# Patient Record
Sex: FEMALE | Race: WHITE | URBAN - METROPOLITAN AREA
[De-identification: names, ages, dates, MRNs, and addresses within clinical notes are randomized per-mention and may not be internally consistent; named-entity substitution may affect disease eponyms.]

---

## 2019-04-10 ENCOUNTER — OFFICE VISIT (OUTPATIENT)
Dept: URBAN - METROPOLITAN AREA CLINIC 16 | Facility: CLINIC | Age: 59
End: 2019-04-10
Payer: COMMERCIAL

## 2019-04-10 DIAGNOSIS — H25.13 AGE-RELATED NUCLEAR CATARACT, BILATERAL: ICD-10-CM

## 2019-04-10 DIAGNOSIS — E11.9 TYPE 2 DIABETES MELLITUS WITHOUT COMPLICATIONS: Primary | ICD-10-CM

## 2019-04-10 PROCEDURE — 92004 COMPRE OPH EXAM NEW PT 1/>: CPT | Performed by: OPTOMETRIST

## 2019-04-10 ASSESSMENT — KERATOMETRY
OD: 45.38
OS: 45.50

## 2019-04-10 ASSESSMENT — INTRAOCULAR PRESSURE
OS: 17
OD: 17

## 2019-04-10 NOTE — IMPRESSION/PLAN
Impression: Type 2 diabetes mellitus without complications: P55.8. Plan: Diabetes type II: no background retinopathy, no signs of neovascularization or macular edema noted. Discussed ocular and systemic benefits of blood sugar control.

## 2023-09-06 RX ORDER — ESOMEPRAZOLE MAGNESIUM 40 MG/1
40 CAPSULE, DELAYED RELEASE ORAL
COMMUNITY

## 2023-09-06 RX ORDER — LOSARTAN POTASSIUM 25 MG/1
25 TABLET ORAL DAILY
COMMUNITY

## 2023-09-06 RX ORDER — TRAMADOL HYDROCHLORIDE 50 MG/1
50 TABLET ORAL EVERY 6 HOURS PRN
COMMUNITY
End: 2023-10-27

## 2023-09-06 RX ORDER — HYDROCHLOROTHIAZIDE 12.5 MG/1
12.5 TABLET ORAL DAILY
COMMUNITY

## 2023-10-16 NOTE — PROGRESS NOTES
Discharge plan according to [facility]:     09/06/23 1431   Discharge Planning   Patient/Family Anticipates Transition to home with family   Living Arrangements   People in Home child(tristin), adult  (daughter)   Type of Residence Private Residence   Is your private residence a single family home or apartment? Single family home   Number of Stairs, Within Home, Primary ten   Once home, are you able to live on one level? Yes   Which level? Main Level   Bathroom Shower/Tub Walk-in shower   Equipment Currently Used at Home raised toilet seat   Support System   Support Systems Children   Do you have someone available to stay with you one or two nights once you are home? Yes

## 2023-10-26 NOTE — PROVIDER NOTIFICATION
I am evaluating this patient for upcoming Right Total Knee Arthroplasty with Dr. Long at [facility] on 11/2/23:    - Notified by Ortho Education RN today that patient had recent elevated hemoglobin A1C 8.2 on 10/2/23. On dulaglutide (Trulicity) for diabetes. Goal A1C is < 8.0 for elective total joint arthroplasty surgery. Message sent to Dr. Long and his PA-C notifying them of this A1C. Waiting for response.  Ortho Education RN left detailed voicemail for patient instructing her to please hold dulaglutide (Trulicity) for 7 days before surgery. Preop H&P is scheduled for 10/27/23 at [facility]. Will follow up on results of preop H&P as soon as complete. Call me below if any questions.     - Update 10/26/23 at 2:24 pm: Dr. Long responded and is tentatively ok proceeding with surgery with A1C 8.2 since patient recently changed her diabetes medication and reports daily BG checks have been much improved over past 3-4 weeks. However, we will wait to see results of preop H&P visit scheduled tomorrow, 10/27/23 before making final decision about proceeding. Call me below if any questions.     - Update 10/30/23 at 11:30 am: reviewed preop H&P and labs. Cleared by PCP for surgery. No abnormal labs at preop exam on 10/27/23. Sent message confirming Dr. Long is ok proceeding with hemoglobin A1C 8.2 on 10/2/23. Waiting for response.     - Update 10/30/23 at 12:28 pm: Dr. Long is ok proceeding with A1C of 8.2. Full Treatment Plan note to follow.       MAXIMO Mirza, CNP   Advanced Practice Nurse Navigator- Orthopedics  [facility]   Office Phone: [phone]  Direct Fax: [phone]

## 2023-10-27 NOTE — H&P (VIEW-ONLY)
[facility]      Preoperative Consultation   Savanah Chung   : 1960   Gender: female    Date of Encounter: 10/27/2023    Nursing Notes:   Zulay Mckay  10/27/2023  3:32 PM  Sign at exiting of workspace  Savanah Chung is a 63 y.o. female (1960) who presents for preop evaluation undergoing surgery for treatment of right knee arthroplasty.    Date of Surgery: 23  Surgical Specialty: Orthopedic/Spine  Sean Long MD  Hospital/Surgical Facility:  [facility]   Fax number: [phone]  Surgery type: inpatient  Primary Physician: Ree Staton LPN................... 10/27/2023 3:32 PM      Zulay Mckay  10/27/2023  3:36 PM  Sign at exiting of workspace  Chief Complaint   Patient presents with     Pre-Op Exam     23       Additional visit information (chief complaint/health maintenance) shared by patient:     Health Maintenance Due   Topic Date Due     COVID-19 vaccine series (1) Never done     Pneumococcal series for age 6-64 (1 - PCV) Never done     Tdap  Never done     Depression screening for age 12+  Never done     HIV for age 15-65  Never done     Hepatitis C screening for age 18-79  Never done     Tetanus booster  Never done     Mammogram for age 45-75  Never done     Colonoscopy through age 75  Never done     Zoster (shingles) series for age 50+ (1 of 2) Never done     Influenza for age 50-64  Never done       Health maintenance reviewed with patient Yes    Patient presents for an in-person office visit: alone  Communication Method: Patient is active on [employer] and has been instructed that results/communications will be made via [employer]  If a phone call is needed, the preferred number is:  Mobile   Home Phone [phone]   Mobile [phone]     May we leave a detailed message at this number? Yes    Zulay Mckay LPN................... 10/27/2023 3:32 PM           History of Present Illness   Right knee surgery, pt has severe arthritis and trouble  getting around.      Review of Systems   A comprehensive review of systems was negative except for items noted in HPI.    Patient Active Problem List   Diagnosis Code     Type 2 diabetes mellitus without complication, without long-term current use of insulin (HC) E11.9     Chronic GERD K21.9     Primary hypertension I10     Arthritis M19.90     Current Outpatient Medications   Medication Sig     dulaglutide (TRULICITY) 1.5 mg/0.5 mL subcutaneous pen Inject 1.5 mg subcutaneous once weekly.     esomeprazole (NEXIUM) 40 mg capsule Take 1 Capsule (40 mg) by mouth once daily.     hydroCHLOROthiazide 12.5 mg tablet Take 1 Tablet (12.5 mg) by mouth once daily.     losartan (COZAAR) 25 mg tablet Take 1 Tablet (25 mg) by mouth once daily.     traMADoL (ULTRAM) 50 mg tablet Take 50 mg by mouth.     No current facility-administered medications for this visit.     Medications have been reviewed by me and are current to the best of my knowledge and ability.     Allergies   Allergen Reactions     Amoxicillin Dyspnea     Propoxyphene N-Acetaminophen Dyspnea     Penicillins *Unknown - Follow up needed     Trouble Breathing     Past Surgical History:   . Laterality Date     ANKLE SURGERY Left      CHOLECYSTECTOMY       KNEE SURGERY Right      TOTAL ABDOMINAL HYSTERECTOMY       Social History     Tobacco Use     Smoking status: Never     Smokeless tobacco: Never   Vaping Use     Vaping Use: Never used   Substance Use Topics     Alcohol use: Never     Drug use: Never     Family History   Problem Relation Age of Onset     Asthma Mother      Cancer-breast Mother      Hypertension Mother      Kidney disease Mother      Hyperlipidemia Mother      Leukemia Father      Hypertension Father      Diabetes Father      SRINATH disease Brother      Hyperlipidemia Maternal Grandmother      Coronary artery disease Maternal Grandmother      No Known Problems Maternal Grandfather      GI Disease Paternal Grandmother        PAST DIFFICULTY WITH  "ANESTHESIA: Yes, personal hx after ankle surgery was told probable hemorrhage because of BP issues during anesthesia.      Physical Exam   /80 (Cuff Site: Left Arm, Position: Sitting, Cuff Size: Adult Large)   Pulse 88   Ht 1.65 m (5' 4.96\")   Wt 115.2 kg (254 lb)   BMI 42.32 kg/m   Body mass index is 42.32 kg/m .  Physical Exam  Constitutional:       Appearance: Normal appearance.   HENT:      Head: Normocephalic and atraumatic.      Nose: Nose normal.      Mouth/Throat:      Mouth: Mucous membranes are moist.      Pharynx: Oropharynx is clear.   Eyes:      Extraocular Movements: Extraocular movements intact.      Conjunctiva/sclera: Conjunctivae normal.   Cardiovascular:      Rate and Rhythm: Normal rate and regular rhythm.      Heart sounds: Normal heart sounds.   Pulmonary:      Effort: Pulmonary effort is normal.      Breath sounds: Normal breath sounds.   Musculoskeletal:         General: Normal range of motion.      Cervical back: Normal range of motion.   Skin:     General: Skin is warm and dry.   Neurological:      General: No focal deficit present.      Mental Status: She is alert and oriented to person, place, and time.   Psychiatric:         Mood and Affect: Mood normal.         Behavior: Behavior normal.            Assessment / Plan     The Pre-Op Tool    Recommendations      Intermediate Risk Procedure    Risk of CV Complication (RCRI)  0.5%    Current Cardiac Status  Good exertional capacity ( > 4 mets )    Cardiac History  No history of coronary artery disease           Labs  HGB within last 30 days  Potassium within last 30 days  EKG  Baseline EKG within the last 12 months  CXR  Not indicated    Stress Testing  Not indicated    * Testing recommendations are intended to assist, but not direct, clinical decisions.           Type & Screen should be obtained by Anesthesia only if the risk of transfusion is > 5% for the procedure         Hold injectable, non-insulin diabetes medication on the " day of the procedure.  Hold Losartan / HCTZ the evening before and/or morning of the procedure.  Take your usual dose of opioid pain medicine the morning of procedure  Okay to take Acetaminophen (Tylenol) up until the procedure  Hold / avoid NSAIDs (e.g. ibuprofen, naproxen) prior to procedure: 2 days for ibuprofen (Advil) and 4 days for naproxen (Aleve).    * Medication recommendations are not intended to be exhaustive; they are limited to common medications that are potentially dangerous if incorrectly managed          Labs  * Data supports elimination of  routine  laboratory testing in favor of focused,  indicated  testing based on medical co-morbidities. A 2009 study randomized 1061 patients undergoing ambulatory, non-cataract surgery to routine or to indicated testing. Perioperative adverse events were similar (Anesthesia & Analgesia 2009;108:467-75; Anesthesiol. Clin. 2016 Mar;34(1):43-58).  EKG  * Age alone is not an absolute indication for a preoperative EKG, and in the absence of clinical risk factors, there is no consensus on the utility of routine preoperative EKG. Our experts recommend against obtaining an EKG if age < 65 for intermediate risk procedures (Anesthesology. 2012;116(3) 1-17; JACC. 2014;64(21);e1-76).  Stress Testing  * Data from high risk patients undergoing major vascular surgery have failed to demonstrate benefit from either preoperative stress testing or prophylactic revascularization. A large, observational study found low risk of major perioperative adverse events in patients able to achieve =4 mets. Taken together with existing knowledge, for patients with known or suspected CAD, our experts recommend preoperative stress testing only if it is indicated regardless of the planned surgery (N Engl J Med 2004;351:2795-80; J Am Madeline Cardiol 2014;64:9286-2776; IFRAH 2020;324:274-290;).     Session ID: [id number]  Endnotes and bibliography available upon request:  [email or web address]  Labs: pending  ECG: pending    ICD-10-CM    1. Pre-op examination  Z01.818 OK READING EKG - NO CHARGE, COMP ONLY     EKG 12 LEAD     OK ECG ROUTINE ECG W/LEAST 12 LDS W/I&R     HEMOGLOBIN     POTASSIUM      2. Chronic pain of right knee  M25.561     G89.29       3. Type 2 diabetes mellitus without complication, without long-term current use of insulin (HC)  E11.9 URINE ALBUMIN TO CREATININE RATIO, RANDOM     AMB CONSULT TO OPTOMETRY/OPHTHALMOLOGY        Patient is cleared, pending tests ordered today, for planned procedure.   Electronically Signed by:       Ree MARSH, CNP.............. 10/27/2023    3:56 PM

## 2023-10-31 NOTE — TREATMENT PLAN
Orthopedic Surgery Pre-Op Plan: Savanah Chung  pre-op review. This is NOT an H&P   Surgeon: Dr. Long    Hospital: [facility]  Name of Surgery: Right Total Knee Arthroplasty   Date of Surgery: 11/2/23  H&P: Completed on 10/27/23 by Ree Staton NP at [facility].   History of ASA, NSAIDS, vitamin and/or herbal supplements, GLP-1 Agonist medication taken within 10 days?: Yes- on dulaglutide (Trulicity)- patient called and instructed to take last dose on 10/25/23, then hold for surgery.   History of blood thinners?: No    Plan:   1) Discharge Plan: Home morning of POD 1 with assist Daughter. Please see Discharge Planning section near bottom of this note for further details.     2) Hypertension: /80 at preop exam.  Appears reasonably well controlled on losartan and hydrochlorothiazide.  Patient instructed to hold both of these medications on the morning of surgery.    3) Type 2 Diabetes Mellitus: Poor control.  Most recent hemoglobin A1c 8.2 on 10/2/2023.  Cleared by PCP for surgery.  Goal hemoglobin A1c is < 8 for elective total joint surgery.  Dr. Long notified of elevated A1c and is okay proceeding as patient is reporting her daily BG checks are much improved over the past 3-4 weeks since changing diabetes medication to dulaglutide (Trulicity). Dulaglutide is a GLP-1 agonist medication, so patient was instructed to hold it for 7 days before surgery to decrease risk for nausea/vomiting/aspiration with anesthesia. I recommend blood glucose checks at least three times a day and at bedtime during hospital stay. Goal BG < 180 to decrease risk for infection and wound healing complications. Nursing to please notify Hospitalist if BG > 180.      4) Morbid Obesity: BMI 42.3, Wt: 254 lbs at preop exam.  I recommend continued efforts at safe weight loss following recovery from surgery. If patient is interested in further assistance with weight loss, please consider referral to  [facility]. They offer both non-surgical and surgical evidence-based weight loss strategies. Call [phone] to schedule a consultation to learn more.      5) GERD: On omeprazole.    Patient appears medically optimized for upcoming surgery. I would recommend Hospitalist Consult to assist with medical management. Please call me below with any questions on this patient.       Review of Systems Notable for: Hypertension, type 2 diabetes mellitus, morbid obesity, GERD.    Past Medical History:   Past Medical History:   Diagnosis Date    Arthritis     Diabetes (H)     Gastroesophageal reflux disease     Hypertension     Obese      History reviewed. No pertinent surgical history.    Current Medications:  Patient's Medications   New Prescriptions    No medications on file   Previous Medications    DULAGLUTIDE (TRULICITY) 1.5 MG/0.5ML PEN    Inject 1.5 mg Subcutaneous every 7 days Left VM message to stop 10/26/23 before surgery    ESOMEPRAZOLE (NEXIUM) 40 MG DR CAPSULE    Take 40 mg by mouth every morning (before breakfast) Take 30-60 minutes before eating.    HYDROCHLOROTHIAZIDE (HYDRODIURIL) 12.5 MG TABLET    Take 12.5 mg by mouth daily    LOSARTAN (COZAAR) 25 MG TABLET    Take 25 mg by mouth daily   Modified Medications    No medications on file   Discontinued Medications    EMPAGLIFLOZIN (JARDIANCE) 25 MG TABS TABLET    Take 35 mg by mouth daily    TRAMADOL (ULTRAM) 50 MG TABLET    Take 50 mg by mouth every 6 hours as needed for severe pain       ALLERGIES:  Allergies   Allergen Reactions    Penicillins Shortness Of Breath and Swelling    Amoxicillin Difficulty breathing       Social History  Social History     Tobacco Use    Smoking status: Never    Smokeless tobacco: Never   Substance Use Topics    Alcohol use: Not Currently    Drug use: Yes     Comment: tramadol PRN for pain       Any Abnormal Recent Diagnostics? Yes  Hemoglobin A1c 8.2 on 10/2/2023: Shows poor control of type 2  diabetes.  Dr. Long notified and is okay proceeding as patient reports improved daily BG control for the past 3-4 weeks since changing diabetes medication to dulaglutide (Trulicity).  We will monitor BG's closely during hospital stay.  Goal BG <180.    Discharge Planning:   Discharge plan according to [facility]:    Home morning of POD 1 with assist Daughter.     09/06/23 1431   Discharge Planning   Patient/Family Anticipates Transition to home with family   Living Arrangements   People in Home child(tristin), adult  (daughter)   Type of Residence Private Residence   Is your private residence a single family home or apartment? Single family home   Number of Stairs, Within Home, Primary ten   Once home, are you able to live on one level? Yes   Which level? Main Level   Bathroom Shower/Tub Walk-in shower   Equipment Currently Used at Home raised toilet seat   Support System   Support Systems Children   Do you have someone available to stay with you one or two nights once you are home? Yes       MAXIMO Mirza, CNP   Advanced Practice Nurse Navigator- Orthopedics  [facility]   Phone: [phone]

## 2023-11-01 ENCOUNTER — ANESTHESIA EVENT (OUTPATIENT)
Dept: SURGERY | Facility: CLINIC | Age: 63
End: 2023-11-01
Payer: COMMERCIAL

## 2023-11-02 ENCOUNTER — HOSPITAL ENCOUNTER (OUTPATIENT)
Facility: CLINIC | Age: 63
Discharge: HOME | End: 2023-11-05
Attending: STUDENT IN AN ORGANIZED HEALTH CARE EDUCATION/TRAINING PROGRAM | Admitting: STUDENT IN AN ORGANIZED HEALTH CARE EDUCATION/TRAINING PROGRAM
Payer: COMMERCIAL

## 2023-11-02 ENCOUNTER — ANCILLARY PROCEDURE (OUTPATIENT)
Dept: ULTRASOUND IMAGING | Facility: CLINIC | Age: 63
End: 2023-11-02
Attending: ANESTHESIOLOGY
Payer: COMMERCIAL

## 2023-11-02 ENCOUNTER — ANESTHESIA (OUTPATIENT)
Dept: SURGERY | Facility: CLINIC | Age: 63
End: 2023-11-02
Payer: COMMERCIAL

## 2023-11-02 ENCOUNTER — APPOINTMENT (OUTPATIENT)
Dept: RADIOLOGY | Facility: CLINIC | Age: 63
End: 2023-11-02
Attending: STUDENT IN AN ORGANIZED HEALTH CARE EDUCATION/TRAINING PROGRAM
Payer: COMMERCIAL

## 2023-11-02 ENCOUNTER — APPOINTMENT (OUTPATIENT)
Dept: PHYSICAL THERAPY | Facility: CLINIC | Age: 63
End: 2023-11-02
Attending: STUDENT IN AN ORGANIZED HEALTH CARE EDUCATION/TRAINING PROGRAM
Payer: COMMERCIAL

## 2023-11-02 DIAGNOSIS — Z96.651 S/P TOTAL KNEE REPLACEMENT, RIGHT: Primary | ICD-10-CM

## 2023-11-02 DIAGNOSIS — Z96.651 S/P TOTAL KNEE ARTHROPLASTY, RIGHT: ICD-10-CM

## 2023-11-02 LAB
GLUCOSE BLDC GLUCOMTR-MCNC: 178 MG/DL (ref 70–99)
GLUCOSE BLDC GLUCOMTR-MCNC: 183 MG/DL (ref 70–99)
GLUCOSE BLDC GLUCOMTR-MCNC: 213 MG/DL (ref 70–99)
GLUCOSE BLDC GLUCOMTR-MCNC: 258 MG/DL (ref 70–99)
GLUCOSE BLDC GLUCOMTR-MCNC: 271 MG/DL (ref 70–99)

## 2023-11-02 PROCEDURE — C1713 ANCHOR/SCREW BN/BN,TIS/BN: HCPCS | Performed by: STUDENT IN AN ORGANIZED HEALTH CARE EDUCATION/TRAINING PROGRAM

## 2023-11-02 PROCEDURE — 258N000003 HC RX IP 258 OP 636: Performed by: NURSE ANESTHETIST, CERTIFIED REGISTERED

## 2023-11-02 PROCEDURE — 250N000009 HC RX 250: Performed by: STUDENT IN AN ORGANIZED HEALTH CARE EDUCATION/TRAINING PROGRAM

## 2023-11-02 PROCEDURE — 250N000011 HC RX IP 250 OP 636: Performed by: ANESTHESIOLOGY

## 2023-11-02 PROCEDURE — 97530 THERAPEUTIC ACTIVITIES: CPT | Mod: GP

## 2023-11-02 PROCEDURE — 710N000010 HC RECOVERY PHASE 1, LEVEL 2, PER MIN: Performed by: STUDENT IN AN ORGANIZED HEALTH CARE EDUCATION/TRAINING PROGRAM

## 2023-11-02 PROCEDURE — 99244 OFF/OP CNSLTJ NEW/EST MOD 40: CPT | Performed by: FAMILY MEDICINE

## 2023-11-02 PROCEDURE — 272N000001 HC OR GENERAL SUPPLY STERILE: Performed by: STUDENT IN AN ORGANIZED HEALTH CARE EDUCATION/TRAINING PROGRAM

## 2023-11-02 PROCEDURE — 250N000011 HC RX IP 250 OP 636: Mod: JZ | Performed by: NURSE ANESTHETIST, CERTIFIED REGISTERED

## 2023-11-02 PROCEDURE — 250N000013 HC RX MED GY IP 250 OP 250 PS 637: Performed by: PHYSICIAN ASSISTANT

## 2023-11-02 PROCEDURE — C1776 JOINT DEVICE (IMPLANTABLE): HCPCS | Performed by: STUDENT IN AN ORGANIZED HEALTH CARE EDUCATION/TRAINING PROGRAM

## 2023-11-02 PROCEDURE — 258N000003 HC RX IP 258 OP 636: Performed by: ANESTHESIOLOGY

## 2023-11-02 PROCEDURE — 250N000011 HC RX IP 250 OP 636

## 2023-11-02 PROCEDURE — 370N000017 HC ANESTHESIA TECHNICAL FEE, PER MIN: Performed by: STUDENT IN AN ORGANIZED HEALTH CARE EDUCATION/TRAINING PROGRAM

## 2023-11-02 PROCEDURE — 250N000011 HC RX IP 250 OP 636: Performed by: STUDENT IN AN ORGANIZED HEALTH CARE EDUCATION/TRAINING PROGRAM

## 2023-11-02 PROCEDURE — 258N000003 HC RX IP 258 OP 636: Performed by: STUDENT IN AN ORGANIZED HEALTH CARE EDUCATION/TRAINING PROGRAM

## 2023-11-02 PROCEDURE — 999N000065 XR KNEE PORT RIGHT 1/2 VIEWS: Mod: RT

## 2023-11-02 PROCEDURE — 250N000011 HC RX IP 250 OP 636: Mod: JZ | Performed by: STUDENT IN AN ORGANIZED HEALTH CARE EDUCATION/TRAINING PROGRAM

## 2023-11-02 PROCEDURE — 82962 GLUCOSE BLOOD TEST: CPT

## 2023-11-02 PROCEDURE — 999N000141 HC STATISTIC PRE-PROCEDURE NURSING ASSESSMENT: Performed by: STUDENT IN AN ORGANIZED HEALTH CARE EDUCATION/TRAINING PROGRAM

## 2023-11-02 PROCEDURE — 258N000001 HC RX 258: Performed by: STUDENT IN AN ORGANIZED HEALTH CARE EDUCATION/TRAINING PROGRAM

## 2023-11-02 PROCEDURE — 97161 PT EVAL LOW COMPLEX 20 MIN: CPT | Mod: GP

## 2023-11-02 PROCEDURE — 360N000077 HC SURGERY LEVEL 4, PER MIN: Performed by: STUDENT IN AN ORGANIZED HEALTH CARE EDUCATION/TRAINING PROGRAM

## 2023-11-02 PROCEDURE — 97116 GAIT TRAINING THERAPY: CPT | Mod: GP

## 2023-11-02 PROCEDURE — 250N000009 HC RX 250: Performed by: ANESTHESIOLOGY

## 2023-11-02 PROCEDURE — 250N000013 HC RX MED GY IP 250 OP 250 PS 637: Performed by: STUDENT IN AN ORGANIZED HEALTH CARE EDUCATION/TRAINING PROGRAM

## 2023-11-02 PROCEDURE — 250N000012 HC RX MED GY IP 250 OP 636 PS 637: Performed by: FAMILY MEDICINE

## 2023-11-02 DEVICE — PATELLA
Type: IMPLANTABLE DEVICE | Site: KNEE | Status: FUNCTIONAL
Brand: TRIATHLON

## 2023-11-02 DEVICE — CRUCIATE RETAINING FEMORAL
Type: IMPLANTABLE DEVICE | Site: KNEE | Status: FUNCTIONAL
Brand: TRIATHLON

## 2023-11-02 DEVICE — UNIVERSAL TIBIAL BASEPLATE
Type: IMPLANTABLE DEVICE | Site: KNEE | Status: FUNCTIONAL
Brand: TRIATHLON

## 2023-11-02 DEVICE — TIBIAL BEARING INSERT - CS
Type: IMPLANTABLE DEVICE | Site: KNEE | Status: FUNCTIONAL
Brand: TRIATHLON

## 2023-11-02 DEVICE — TOBRA FULL DOSE ANTIBIOTIC BONE CEMENT, 10 PACK CATALOG NUMBER IS 6197-9-010
Type: IMPLANTABLE DEVICE | Site: KNEE | Status: FUNCTIONAL
Brand: SIMPLEX

## 2023-11-02 RX ORDER — HYDROMORPHONE HCL IN WATER/PF 6 MG/30 ML
0.4 PATIENT CONTROLLED ANALGESIA SYRINGE INTRAVENOUS
Status: DISCONTINUED | OUTPATIENT
Start: 2023-11-02 | End: 2023-11-05

## 2023-11-02 RX ORDER — NICOTINE POLACRILEX 4 MG
15-30 LOZENGE BUCCAL
Status: DISCONTINUED | OUTPATIENT
Start: 2023-11-02 | End: 2023-11-05 | Stop reason: HOSPADM

## 2023-11-02 RX ORDER — ONDANSETRON 2 MG/ML
4 INJECTION INTRAMUSCULAR; INTRAVENOUS EVERY 6 HOURS PRN
Status: DISCONTINUED | OUTPATIENT
Start: 2023-11-02 | End: 2023-11-05 | Stop reason: HOSPADM

## 2023-11-02 RX ORDER — ACETAMINOPHEN 325 MG/1
650 TABLET ORAL EVERY 4 HOURS PRN
Status: DISCONTINUED | OUTPATIENT
Start: 2023-11-05 | End: 2023-11-05 | Stop reason: HOSPADM

## 2023-11-02 RX ORDER — NALOXONE HYDROCHLORIDE 0.4 MG/ML
0.4 INJECTION, SOLUTION INTRAMUSCULAR; INTRAVENOUS; SUBCUTANEOUS
Status: DISCONTINUED | OUTPATIENT
Start: 2023-11-02 | End: 2023-11-05 | Stop reason: HOSPADM

## 2023-11-02 RX ORDER — ONDANSETRON 2 MG/ML
4 INJECTION INTRAMUSCULAR; INTRAVENOUS EVERY 30 MIN PRN
Status: DISCONTINUED | OUTPATIENT
Start: 2023-11-02 | End: 2023-11-02

## 2023-11-02 RX ORDER — DEXTROSE MONOHYDRATE 25 G/50ML
25-50 INJECTION, SOLUTION INTRAVENOUS
Status: DISCONTINUED | OUTPATIENT
Start: 2023-11-02 | End: 2023-11-05 | Stop reason: HOSPADM

## 2023-11-02 RX ORDER — ONDANSETRON 2 MG/ML
INJECTION INTRAMUSCULAR; INTRAVENOUS PRN
Status: DISCONTINUED | OUTPATIENT
Start: 2023-11-02 | End: 2023-11-02

## 2023-11-02 RX ORDER — OXYCODONE HYDROCHLORIDE 5 MG/1
10 TABLET ORAL EVERY 4 HOURS PRN
Status: DISCONTINUED | OUTPATIENT
Start: 2023-11-02 | End: 2023-11-02

## 2023-11-02 RX ORDER — HYDROMORPHONE HCL IN WATER/PF 6 MG/30 ML
0.2 PATIENT CONTROLLED ANALGESIA SYRINGE INTRAVENOUS
Status: DISCONTINUED | OUTPATIENT
Start: 2023-11-02 | End: 2023-11-05

## 2023-11-02 RX ORDER — TRANEXAMIC ACID 650 MG/1
1950 TABLET ORAL ONCE
Status: COMPLETED | OUTPATIENT
Start: 2023-11-02 | End: 2023-11-02

## 2023-11-02 RX ORDER — CEFAZOLIN SODIUM/WATER 2 G/20 ML
2 SYRINGE (ML) INTRAVENOUS
Status: COMPLETED | OUTPATIENT
Start: 2023-11-02 | End: 2023-11-02

## 2023-11-02 RX ORDER — HYDROCODONE BITARTRATE AND ACETAMINOPHEN 5; 325 MG/1; MG/1
1-2 TABLET ORAL EVERY 6 HOURS PRN
Status: DISCONTINUED | OUTPATIENT
Start: 2023-11-02 | End: 2023-11-03

## 2023-11-02 RX ORDER — AMOXICILLIN 250 MG
1 CAPSULE ORAL 2 TIMES DAILY
Qty: 30 TABLET | Refills: 0 | Status: SHIPPED | OUTPATIENT
Start: 2023-11-02

## 2023-11-02 RX ORDER — CEFAZOLIN SODIUM 2 G/100ML
2 INJECTION, SOLUTION INTRAVENOUS EVERY 8 HOURS
Qty: 200 ML | Refills: 0 | Status: COMPLETED | OUTPATIENT
Start: 2023-11-02 | End: 2023-11-03

## 2023-11-02 RX ORDER — SODIUM CHLORIDE, SODIUM LACTATE, POTASSIUM CHLORIDE, CALCIUM CHLORIDE 600; 310; 30; 20 MG/100ML; MG/100ML; MG/100ML; MG/100ML
INJECTION, SOLUTION INTRAVENOUS CONTINUOUS
Status: DISCONTINUED | OUTPATIENT
Start: 2023-11-02 | End: 2023-11-05 | Stop reason: HOSPADM

## 2023-11-02 RX ORDER — HYDROXYZINE HYDROCHLORIDE 25 MG/1
25 TABLET, FILM COATED ORAL EVERY 6 HOURS PRN
Qty: 30 TABLET | Refills: 0 | Status: SHIPPED | OUTPATIENT
Start: 2023-11-02

## 2023-11-02 RX ORDER — AMOXICILLIN 250 MG
1 CAPSULE ORAL 2 TIMES DAILY
Status: DISCONTINUED | OUTPATIENT
Start: 2023-11-02 | End: 2023-11-05 | Stop reason: HOSPADM

## 2023-11-02 RX ORDER — OXYCODONE HYDROCHLORIDE 5 MG/1
5 TABLET ORAL EVERY 4 HOURS PRN
Status: DISCONTINUED | OUTPATIENT
Start: 2023-11-02 | End: 2023-11-02

## 2023-11-02 RX ORDER — NALOXONE HYDROCHLORIDE 0.4 MG/ML
0.2 INJECTION, SOLUTION INTRAMUSCULAR; INTRAVENOUS; SUBCUTANEOUS
Status: DISCONTINUED | OUTPATIENT
Start: 2023-11-02 | End: 2023-11-05 | Stop reason: HOSPADM

## 2023-11-02 RX ORDER — PANTOPRAZOLE SODIUM 40 MG/1
40 TABLET, DELAYED RELEASE ORAL
Status: DISCONTINUED | OUTPATIENT
Start: 2023-11-03 | End: 2023-11-05 | Stop reason: HOSPADM

## 2023-11-02 RX ORDER — FENTANYL CITRATE 50 UG/ML
25 INJECTION, SOLUTION INTRAMUSCULAR; INTRAVENOUS EVERY 5 MIN PRN
Status: DISCONTINUED | OUTPATIENT
Start: 2023-11-02 | End: 2023-11-02

## 2023-11-02 RX ORDER — VANCOMYCIN HYDROCHLORIDE 1 G/20ML
INJECTION, POWDER, LYOPHILIZED, FOR SOLUTION INTRAVENOUS PRN
Status: DISCONTINUED | OUTPATIENT
Start: 2023-11-02 | End: 2023-11-02 | Stop reason: HOSPADM

## 2023-11-02 RX ORDER — HYDROXYZINE HYDROCHLORIDE 25 MG/1
25 TABLET, FILM COATED ORAL EVERY 6 HOURS PRN
Status: DISCONTINUED | OUTPATIENT
Start: 2023-11-02 | End: 2023-11-05 | Stop reason: HOSPADM

## 2023-11-02 RX ORDER — VANCOMYCIN HYDROCHLORIDE 1 G/20ML
INJECTION, POWDER, LYOPHILIZED, FOR SOLUTION INTRAVENOUS
Status: DISCONTINUED
Start: 2023-11-02 | End: 2023-11-02 | Stop reason: HOSPADM

## 2023-11-02 RX ORDER — PROCHLORPERAZINE MALEATE 10 MG
10 TABLET ORAL EVERY 6 HOURS PRN
Status: DISCONTINUED | OUTPATIENT
Start: 2023-11-02 | End: 2023-11-05 | Stop reason: HOSPADM

## 2023-11-02 RX ORDER — LOSARTAN POTASSIUM 25 MG/1
25 TABLET ORAL DAILY
Status: DISCONTINUED | OUTPATIENT
Start: 2023-11-03 | End: 2023-11-05 | Stop reason: HOSPADM

## 2023-11-02 RX ORDER — BISACODYL 10 MG
10 SUPPOSITORY, RECTAL RECTAL DAILY PRN
Status: DISCONTINUED | OUTPATIENT
Start: 2023-11-02 | End: 2023-11-05 | Stop reason: HOSPADM

## 2023-11-02 RX ORDER — CEFAZOLIN SODIUM/WATER 2 G/20 ML
2 SYRINGE (ML) INTRAVENOUS SEE ADMIN INSTRUCTIONS
Status: DISCONTINUED | OUTPATIENT
Start: 2023-11-02 | End: 2023-11-02 | Stop reason: HOSPADM

## 2023-11-02 RX ORDER — SODIUM CHLORIDE, SODIUM LACTATE, POTASSIUM CHLORIDE, CALCIUM CHLORIDE 600; 310; 30; 20 MG/100ML; MG/100ML; MG/100ML; MG/100ML
INJECTION, SOLUTION INTRAVENOUS CONTINUOUS
Status: DISCONTINUED | OUTPATIENT
Start: 2023-11-02 | End: 2023-11-02 | Stop reason: HOSPADM

## 2023-11-02 RX ORDER — ACETAMINOPHEN 325 MG/1
975 TABLET ORAL EVERY 8 HOURS
Qty: 27 TABLET | Refills: 0 | Status: DISCONTINUED | OUTPATIENT
Start: 2023-11-02 | End: 2023-11-02

## 2023-11-02 RX ORDER — SODIUM CHLORIDE, SODIUM LACTATE, POTASSIUM CHLORIDE, CALCIUM CHLORIDE 600; 310; 30; 20 MG/100ML; MG/100ML; MG/100ML; MG/100ML
INJECTION, SOLUTION INTRAVENOUS CONTINUOUS
Status: DISCONTINUED | OUTPATIENT
Start: 2023-11-02 | End: 2023-11-02

## 2023-11-02 RX ORDER — ACETAMINOPHEN 325 MG/1
975 TABLET ORAL EVERY 8 HOURS
Qty: 100 TABLET | Refills: 0 | Status: SHIPPED | OUTPATIENT
Start: 2023-11-02

## 2023-11-02 RX ORDER — LIDOCAINE 40 MG/G
CREAM TOPICAL
Status: DISCONTINUED | OUTPATIENT
Start: 2023-11-02 | End: 2023-11-05 | Stop reason: HOSPADM

## 2023-11-02 RX ORDER — FENTANYL CITRATE 50 UG/ML
25-100 INJECTION, SOLUTION INTRAMUSCULAR; INTRAVENOUS
Status: DISCONTINUED | OUTPATIENT
Start: 2023-11-02 | End: 2023-11-02 | Stop reason: HOSPADM

## 2023-11-02 RX ORDER — ONDANSETRON 4 MG/1
4 TABLET, ORALLY DISINTEGRATING ORAL EVERY 30 MIN PRN
Status: DISCONTINUED | OUTPATIENT
Start: 2023-11-02 | End: 2023-11-02

## 2023-11-02 RX ORDER — BUPIVACAINE HYDROCHLORIDE 5 MG/ML
INJECTION, SOLUTION EPIDURAL; INTRACAUDAL
Status: COMPLETED | OUTPATIENT
Start: 2023-11-02 | End: 2023-11-02

## 2023-11-02 RX ORDER — LIDOCAINE 40 MG/G
CREAM TOPICAL
Status: DISCONTINUED | OUTPATIENT
Start: 2023-11-02 | End: 2023-11-02 | Stop reason: HOSPADM

## 2023-11-02 RX ORDER — ONDANSETRON 4 MG/1
4 TABLET, ORALLY DISINTEGRATING ORAL EVERY 6 HOURS PRN
Status: DISCONTINUED | OUTPATIENT
Start: 2023-11-02 | End: 2023-11-05 | Stop reason: HOSPADM

## 2023-11-02 RX ORDER — TRAMADOL HYDROCHLORIDE 50 MG/1
50-100 TABLET ORAL EVERY 8 HOURS PRN
Status: ON HOLD | COMMUNITY
End: 2023-11-02

## 2023-11-02 RX ORDER — ASPIRIN 81 MG/1
81 TABLET ORAL 2 TIMES DAILY
Qty: 60 TABLET | Refills: 0 | Status: SHIPPED | OUTPATIENT
Start: 2023-11-02 | End: 2023-12-02

## 2023-11-02 RX ORDER — DEXAMETHASONE SODIUM PHOSPHATE 10 MG/ML
INJECTION, SOLUTION INTRAMUSCULAR; INTRAVENOUS PRN
Status: DISCONTINUED | OUTPATIENT
Start: 2023-11-02 | End: 2023-11-02

## 2023-11-02 RX ORDER — HYDROMORPHONE HCL IN WATER/PF 6 MG/30 ML
0.4 PATIENT CONTROLLED ANALGESIA SYRINGE INTRAVENOUS EVERY 5 MIN PRN
Status: DISCONTINUED | OUTPATIENT
Start: 2023-11-02 | End: 2023-11-02

## 2023-11-02 RX ORDER — PROPOFOL 10 MG/ML
INJECTION, EMULSION INTRAVENOUS CONTINUOUS PRN
Status: DISCONTINUED | OUTPATIENT
Start: 2023-11-02 | End: 2023-11-02

## 2023-11-02 RX ORDER — METOCLOPRAMIDE HYDROCHLORIDE 5 MG/ML
INJECTION INTRAMUSCULAR; INTRAVENOUS
Status: DISCONTINUED
Start: 2023-11-02 | End: 2023-11-02 | Stop reason: HOSPADM

## 2023-11-02 RX ORDER — ASPIRIN 81 MG/1
81 TABLET ORAL 2 TIMES DAILY
Status: DISCONTINUED | OUTPATIENT
Start: 2023-11-02 | End: 2023-11-05 | Stop reason: HOSPADM

## 2023-11-02 RX ORDER — POLYETHYLENE GLYCOL 3350 17 G/17G
17 POWDER, FOR SOLUTION ORAL DAILY
Status: DISCONTINUED | OUTPATIENT
Start: 2023-11-03 | End: 2023-11-05 | Stop reason: HOSPADM

## 2023-11-02 RX ORDER — HYDROMORPHONE HCL IN WATER/PF 6 MG/30 ML
0.2 PATIENT CONTROLLED ANALGESIA SYRINGE INTRAVENOUS EVERY 5 MIN PRN
Status: DISCONTINUED | OUTPATIENT
Start: 2023-11-02 | End: 2023-11-02

## 2023-11-02 RX ORDER — FENTANYL CITRATE 50 UG/ML
50 INJECTION, SOLUTION INTRAMUSCULAR; INTRAVENOUS EVERY 5 MIN PRN
Status: DISCONTINUED | OUTPATIENT
Start: 2023-11-02 | End: 2023-11-02

## 2023-11-02 RX ORDER — CEFADROXIL 500 MG/1
500 CAPSULE ORAL 2 TIMES DAILY
Qty: 14 CAPSULE | Refills: 0 | Status: SHIPPED | OUTPATIENT
Start: 2023-11-02 | End: 2023-11-09

## 2023-11-02 RX ORDER — OXYCODONE HYDROCHLORIDE 5 MG/1
5 TABLET ORAL EVERY 4 HOURS PRN
Qty: 26 TABLET | Refills: 0 | Status: SHIPPED | OUTPATIENT
Start: 2023-11-02 | End: 2023-11-02

## 2023-11-02 RX ADMIN — CEFAZOLIN SODIUM 2 G: 2 INJECTION, SOLUTION INTRAVENOUS at 14:36

## 2023-11-02 RX ADMIN — ASPIRIN 81 MG: 81 TABLET, COATED ORAL at 20:39

## 2023-11-02 RX ADMIN — LIDOCAINE HYDROCHLORIDE 50 MG: 10 INJECTION, SOLUTION EPIDURAL; INFILTRATION; INTRACAUDAL; PERINEURAL at 07:28

## 2023-11-02 RX ADMIN — CEFAZOLIN SODIUM 2 G: 2 INJECTION, SOLUTION INTRAVENOUS at 22:44

## 2023-11-02 RX ADMIN — HYDROMORPHONE HYDROCHLORIDE 0.2 MG: 0.2 INJECTION, SOLUTION INTRAMUSCULAR; INTRAVENOUS; SUBCUTANEOUS at 22:52

## 2023-11-02 RX ADMIN — PHENYLEPHRINE HYDROCHLORIDE 0.2 MCG/KG/MIN: 10 INJECTION INTRAVENOUS at 08:14

## 2023-11-02 RX ADMIN — ONDANSETRON 4 MG: 2 INJECTION INTRAMUSCULAR; INTRAVENOUS at 07:49

## 2023-11-02 RX ADMIN — HYDROCODONE BITARTRATE AND ACETAMINOPHEN 1 TABLET: 5; 325 TABLET ORAL at 21:10

## 2023-11-02 RX ADMIN — SODIUM CHLORIDE, POTASSIUM CHLORIDE, SODIUM LACTATE AND CALCIUM CHLORIDE: 600; 310; 30; 20 INJECTION, SOLUTION INTRAVENOUS at 12:30

## 2023-11-02 RX ADMIN — PROPOFOL 100 MCG/KG/MIN: 10 INJECTION, EMULSION INTRAVENOUS at 07:31

## 2023-11-02 RX ADMIN — BUPIVACAINE HYDROCHLORIDE 15 ML: 5 INJECTION, SOLUTION EPIDURAL; INTRACAUDAL at 07:08

## 2023-11-02 RX ADMIN — DEXAMETHASONE SODIUM PHOSPHATE 4 MG: 10 INJECTION, SOLUTION INTRAMUSCULAR; INTRAVENOUS at 07:40

## 2023-11-02 RX ADMIN — BUPIVACAINE HYDROCHLORIDE 2.6 ML: 5 INJECTION, SOLUTION EPIDURAL; INTRACAUDAL; PERINEURAL at 07:21

## 2023-11-02 RX ADMIN — SODIUM CHLORIDE, POTASSIUM CHLORIDE, SODIUM LACTATE AND CALCIUM CHLORIDE: 600; 310; 30; 20 INJECTION, SOLUTION INTRAVENOUS at 06:42

## 2023-11-02 RX ADMIN — FENTANYL CITRATE 50 MCG: 50 INJECTION INTRAMUSCULAR; INTRAVENOUS at 07:23

## 2023-11-02 RX ADMIN — INSULIN ASPART 3 UNITS: 100 INJECTION, SOLUTION INTRAVENOUS; SUBCUTANEOUS at 12:27

## 2023-11-02 RX ADMIN — TRANEXAMIC ACID 1950 MG: 650 TABLET ORAL at 06:20

## 2023-11-02 RX ADMIN — PHENYLEPHRINE HYDROCHLORIDE 100 MCG: 10 INJECTION INTRAVENOUS at 08:14

## 2023-11-02 RX ADMIN — ASPIRIN 81 MG: 81 TABLET, COATED ORAL at 12:22

## 2023-11-02 RX ADMIN — PHENYLEPHRINE HYDROCHLORIDE 300 MCG: 10 INJECTION INTRAVENOUS at 09:06

## 2023-11-02 RX ADMIN — SODIUM CHLORIDE, POTASSIUM CHLORIDE, SODIUM LACTATE AND CALCIUM CHLORIDE: 600; 310; 30; 20 INJECTION, SOLUTION INTRAVENOUS at 07:55

## 2023-11-02 RX ADMIN — MIDAZOLAM HYDROCHLORIDE 2 MG: 1 INJECTION, SOLUTION INTRAMUSCULAR; INTRAVENOUS at 07:07

## 2023-11-02 RX ADMIN — Medication 2 G: at 07:17

## 2023-11-02 RX ADMIN — FENTANYL CITRATE 100 MCG: 50 INJECTION INTRAMUSCULAR; INTRAVENOUS at 07:07

## 2023-11-02 RX ADMIN — HYDROCODONE BITARTRATE AND ACETAMINOPHEN 1 TABLET: 5; 325 TABLET ORAL at 14:35

## 2023-11-02 ASSESSMENT — ACTIVITIES OF DAILY LIVING (ADL)
ADLS_ACUITY_SCORE: 37
ADLS_ACUITY_SCORE: 36
ADLS_ACUITY_SCORE: 37
ADLS_ACUITY_SCORE: 35
ADLS_ACUITY_SCORE: 35
ADLS_ACUITY_SCORE: 36
ADLS_ACUITY_SCORE: 37
ADLS_ACUITY_SCORE: 36
ADLS_ACUITY_SCORE: 37

## 2023-11-02 NOTE — PROGRESS NOTES
11/02/23 1500   Appointment Info   Signing Clinician's Name / Credentials (PT) Harry Harris, PT, DPT   Quick Adds   Quick Adds Certification   Living Environment   People in Home child(tristin), adult   Current Living Arrangements house   Home Accessibility no concerns   Self-Care   Usual Activity Tolerance moderate   Current Activity Tolerance moderate   Equipment Currently Used at Home none   Fall history within last six months no   General Information   Onset of Illness/Injury or Date of Surgery 11/02/23   Referring Physician Dr. Long   Patient/Family Therapy Goals Statement (PT) Decrease pain with mobility   Pertinent History of Current Problem (include personal factors and/or comorbidities that impact the POC) s/p TKA   Existing Precautions/Restrictions weight bearing   Weight-Bearing Status - LLE full weight-bearing   Weight-Bearing Status - RLE weight-bearing as tolerated   Range of Motion (ROM)   ROM Comment WFL, decreased RLE s/p TKA   Strength (Manual Muscle Testing)   Strength Comments WFL   Bed Mobility   Bed Mobility supine-sit   Supine-Sit [place] (Bed Mobility) modified independence;verbal cues   Assistive Device (Bed Mobility) bed rails   Transfers   Transfers sit-stand transfer   Sit-Stand Transfer   Sit-Stand [place] (Transfers) contact guard   Assistive Device (Sit-Stand Transfers) walker, 4-wheeled   Gait/Stairs (Locomotion)   [place] Level (Gait) contact guard   Assistive Device (Gait) walker, 4-wheeled   Distance in Feet (Gait) 10'   Pattern (Gait) step-through   Deviations/Abnormal Patterns (Gait) antalgic;maximo decreased;stride length decreased   Clinical Impression   Criteria for Skilled Therapeutic Intervention Yes, treatment indicated   PT Diagnosis (PT) Impaired functional mobility   Influenced by the following impairments Weakness, pain   Functional limitations due to impairments Transfers, gait   Clinical Presentation (PT Evaluation Complexity) stable   Clinical  Presentation Rationale Pt presents as medically diagnosed   Clinical Decision Making (Complexity) low complexity   Planned Therapy Interventions (PT) gait training;home exercise program;patient/family education;ROM (range of motion);strengthening;transfer training   Risk & Benefits of therapy have been explained care plan/treatment goals reviewed;patient;daughter   PT Total Evaluation Time   PT Eval, Low Complexity Minutes (36163) 10   Therapy Certification   Start of care date 11/02/23   Certification date from 11/02/23   Certification date to 12/02/23   Medical Diagnosis s/p TKA   Physical Therapy Goals   PT Frequency Daily   PT Predicted Duration/Target Date for Goal Attainment 11/04/23   PT Goals Transfers;Gait;PT Goal 1   PT: Transfers Supervision/stand-by assist;Sit to/from stand   PT: Gait Supervision/stand-by assist;Rolling walker;150 feet   PT: Goal 1 I with TKA HEP   Interventions   Interventions Quick Adds Gait Training;Therapeutic Activity   Therapeutic Activity   Therapeutic Activities: dynamic activities to improve functional performance Minutes (48958) 15   Symptoms Noted During/After Treatment Fatigue;Increased pain   Treatment Detail/Skilled Intervention Supine to sit Mod I with HOB elevated and use of bed rail, increased time for set up and due to this being first time up. Sit<>stand x 2 CGA, independent with toileting. SBA at EOB. Educated on icing, pain control, POC, role of therapies.   Gait Training   Gait Training Minutes (14385) 10   Symptoms Noted During/After Treatment (Gait Training) fatigue;increased pain   Treatment Detail/Skilled Intervention Pt amb moderately well in the halls CGA with 4WW, to bathroom first, no LOB or adverse s/s. Encouraged mobility with nursing tonight. Educated on walking program. Educated on AD use, FWW vs. 4WW, use of brakes and safety with mobility and ADs.   Distance in Feet 100'   [place] Level (Gait Training) contact guard   Physical Assistance Level  (Gait Training) verbal cues;supervision   Weight Bearing (Gait Training) weight-bearing as tolerated   Assistive Device (Gait Training) rolling walker   Pattern Analysis (Gait Training) swing-through gait   Gait Analysis Deviations decreased maximo;decreased step length   Impairments (Gait Analysis/Training) pain;strength decreased   PT Discharge Planning   PT Plan Increase amb, TKA HEP   PT Discharge Recommendation (DC Rec)   (Defer to ortho team)   PT Rationale for DC Rec Ambulating and transferring well, will be mostly on own upon d/c. No stairs, pain moderate.   PT Brief overview of current status CGA transfers and amb 100' with 4WW   PT Equipment Needed at Discharge   (Has 4WW)   [facility]  OUTPATIENT PHYSICAL THERAPY EVALUATION  PLAN OF TREATMENT FOR OUTPATIENT REHABILITATION  (COMPLETE FOR INITIAL CLAIMS ONLY)  Patient's Last Name, First Name, M.I.  YOB: 1960  Savanah Chung                        Provider's Name  [facility] Medical Record No.  [id number]                             Onset Date:  11/02/23   Start of Care Date:  11/02/23   Type:     _X_PT   ___OT   ___SLP Medical Diagnosis:  s/p TKA              PT Diagnosis:  Impaired functional mobility Visits from SOC:  1     See note for plan of treatment, functional goals and certification details    I CERTIFY THE NEED FOR THESE SERVICES FURNISHED UNDER        THIS PLAN OF TREATMENT AND WHILE UNDER MY CARE     (Physician co-signature of this document indicates review and certification of the therapy plan).

## 2023-11-02 NOTE — ANESTHESIA PREPROCEDURE EVALUATION
"Anesthesia Pre-Procedure Evaluation    Patient: Savanah Chung   MRN: [id number] : 1960        Procedure : Procedure(s):  RIGHT TOTAL KNEE ARTHROPLASTY          Past Medical History:   Diagnosis Date    Arthritis     Diabetes (H)     Gastroesophageal reflux disease     Hypertension     Obese       History reviewed. No pertinent surgical history.   Allergies   Allergen Reactions    Penicillins Shortness Of Breath and Swelling    Amoxicillin Difficulty breathing      Social History     Tobacco Use    Smoking status: Never    Smokeless tobacco: Never   Substance Use Topics    Alcohol use: Not Currently      Wt Readings from Last 1 Encounters:   23 115.2 kg (254 lb)        Anesthesia Evaluation   Pt has had prior anesthetic.         ROS/MED HX  ENT/Pulmonary:  - neg pulmonary ROS     Neurologic:  - neg neurologic ROS     Cardiovascular:     (+)  hypertension- -   -  - -                                      METS/Exercise Tolerance:     Hematologic:  - neg hematologic  ROS     Musculoskeletal:   (+)  arthritis,             GI/Hepatic:     (+) GERD,                   Renal/Genitourinary:  - neg Renal ROS     Endo:     (+)  type II DM,             Obesity,       Psychiatric/Substance Use:  - neg psychiatric ROS     Infectious Disease:       Malignancy:  - neg malignancy ROS     Other:  - neg other ROS          Physical Exam    Airway  airway exam normal      Mallampati: II   TM distance: > 3 FB   Neck ROM: full   Mouth opening: > 3 cm    Respiratory Devices and Support         Dental       (+) Modest Abnormalities - crowns, retainers, 1 or 2 missing teeth      Cardiovascular   cardiovascular exam normal       Rhythm and rate: regular and normal     Pulmonary   pulmonary exam normal        breath sounds clear to auscultation           OUTSIDE LABS:  CBC: No results found for: \"WBC\", \"HGB\", \"HCT\", \"PLT\"  BMP: No results found for: \"NA\", \"POTASSIUM\", \"CHLORIDE\", \"CO2\", \"BUN\", \"CR\", \"GLC\"  COAGS: No results found " "for: \"PTT\", \"INR\", \"FIBR\"  POC: No results found for: \"BGM\", \"HCG\", \"HCGS\"  HEPATIC: No results found for: \"ALBUMIN\", \"PROTTOTAL\", \"ALT\", \"AST\", \"GGT\", \"ALKPHOS\", \"BILITOTAL\", \"BILIDIRECT\", \"REBEKAH\"  OTHER: No results found for: \"PH\", \"LACT\", \"A1C\", \"MARC\", \"PHOS\", \"MAG\", \"LIPASE\", \"AMYLASE\", \"TSH\", \"T4\", \"T3\", \"CRP\", \"SED\"    Anesthesia Plan    ASA Status:  3    NPO Status:  NPO Appropriate    Anesthesia Type: Spinal.              Consents    Anesthesia Plan(s) and associated risks, benefits, and realistic alternatives discussed. Questions answered and patient/representative(s) expressed understanding.     - Discussed:     - Discussed with:  Patient      - Extended Intubation/Ventilatory Support Discussed: No.           Postoperative Care    Pain management: IV analgesics, Oral pain medications, Peripheral nerve block (Single Shot).   PONV prophylaxis: Ondansetron (or other 5HT-3), Dexamethasone or Solumedrol, Background Propofol Infusion     Comments:                Brayan Smith MD  "

## 2023-11-02 NOTE — PHARMACY-ADMISSION MEDICATION HISTORY
Pharmacist Admission Medication History    Admission medication history is complete. The information provided in this note is only as accurate as the sources available at the time of the update.    Information Source(s): Patient and CareEverywhere/SureScripts via in-person    Pertinent Information: Utilized pt provided home med list.    Allergies reviewed with patient and updates made in EHR: yes    Medication History Completed By: Michelle Robison PharmAVANI 11/2/2023 6:51 AM    Prior to Admission medications    Medication Sig Last Dose Taking? Auth Provider Long Term End Date   esomeprazole (NEXIUM) 40 MG DR capsule Take 40 mg by mouth every morning (before breakfast) Take 30-60 minutes before eating. 11/1/2023 Yes Reported, Patient     hydrochlorothiazide (HYDRODIURIL) 12.5 MG tablet Take 12.5 mg by mouth daily 11/1/2023 Yes Reported, Patient Yes    losartan (COZAAR) 25 MG tablet Take 25 mg by mouth daily 11/1/2023 Yes Reported, Patient Yes    traMADol (ULTRAM) 50 MG tablet Take  mg by mouth every 8 hours as needed for severe pain 11/1/2023 at am Yes Unknown, Entered By History     dulaglutide (TRULICITY) 1.5 MG/0.5ML pen Inject 1.5 mg Subcutaneous every 7 days Left  message to stop 10/26/23 before surgery 10/25/2023  Reported, Patient

## 2023-11-02 NOTE — OP NOTE
PATIENT: Savanah Chung  MR# :   [id number]  DATE OF OPERATION: 11/02/23    SURGEON:  Sean Long MD     ASSISTANT:  Patrice Pacheco PA-C     A skilled assistant was required due to the nature of the case for patient position, retraction, exposure, implant placement, closure and dressing application.      PREOPERATIVE DIAGNOSIS:   Osteoarthritis, right knee.      POSTOPERATIVE DIAGNOSIS:   Osteoarthritis, right knee.      PROCEDURE PERFORMED:   Right total knee arthroplasty      ANESTHESIA:   Spinal plus local     SPECIMEN:  Arthroplasty resection material    ESTIMATED BLOOD LOSS:   50cc     BLOOD REPLACED:   None.     TOURNIQUET TIME:   53 Mins @ 250 mm HG    FINDINGS:    Grade 4 chondromalacia throughout, large marginal osteophytic spurs, modest medial tibial sunchondral bone loss.    COMPLICATIONS:    None apparent.    COMPONENTS IMPLANTED:   Implant Name Type Inv. Item Serial No.  Lot No. LRB No. Used Action   BONE CEMENT SIMPLEX W/TOBRAMYCIN 6197-9-001 - [id number] Cement, Bone BONE CEMENT SIMPLEX W/TOBRAMYCIN 6197-9-001  [employer]  Right 2 Implanted   INSERT TIB 3 12MM CNDRL STAB BRNG TRTHLN STRL LF - [id number] Total Joint Component/Insert INSERT TIB 3 12MM CNDRL STAB BRNG TRTHLN STRL LF  [employer] [id number] Right 1 Implanted   IMP INSERT BASEPLATE TIBIAL HOWM TRI 3 5521-B-300 - [id number] Total Joint Component/Insert IMP INSERT BASEPLATE TIBIAL HOWM TRI 3 5521-B-300  [employer] [id number] Right 1 Implanted   IMP COMP FEM STRK TRIATHLN CR RT 4 5510-F-402 - [id number] Total Joint Component/Insert IMP COMP FEM STRK TRIATHLN CR RT 4 5510-F-402  [employer] [id number] Right 1 Implanted   IMP COMP PATELLA HOWM ASYM TRI [id number] 5551-L-320 - [id number] Total Joint Component/Insert IMP COMP PATELLA HOWM ASYM TRI [id number] 5551-L-320  [employer] [id number] Right 1 Implanted          INDICATIONS:   Savanah Chung is a 63 year old-year-old female who has been having  symptoms on their right knee substantial enough to interfere with their activities of daily living.  Initial x-rays demonstrated advanced osteoarthritis.  They failed nonoperative treatment measures. The risks, benefits, limitations, alternatives and ramifications of TKA have been discussed in detail.  All complications were discussed pre-operatively including but not limited to pain, infection, scar, stiffness, blood loss, need for transfusion, neurovascular injury, implant failure, fracture, DVT, PE, MI, CVA, dislocation, loss of limb, loss of life, need for additional surgery.  All of their questions were answered.  They expressed understanding. Consent was obtained and they wished to proceed.    DESCRIPTION OF THE PROCEDURE:  The patient was identified in the preoperative holding area and the operative site was marked with indelible marker.  The patient was brought in the operating room where they received preoperative antibiotics, 1 G of TXA, and a tourniquet was placed high on the thigh.  Verification was performed identifying the correct patient, operation to be performed, and location of the procedure.  Patient was then induced under anesthesia.  The operative extremity was prepped and draped in the usual sterile fashion.  Final timeout was then performed identifying the correct patient, operation be performed, and location the procedure.  The limb was exsanguinated and tourniquet inflated to 250 mmHg.    A longitudinal anterior incision was made over the operative knee and carried down in a single full-thickness flap to the level of the quadriceps tendon and extensor retinaculum.  A medial parapatellar arthrotomy was performed.  Arthritic changes were noted in the knee, but mostly involving the medial and patellofemoral compartments.  Medial collateral ligament was lifted off the proximal medial tibia in a subperiosteal fashion.  ACL was resected.  Synovium proximal to trochlea was removed.  Infrapatellar  fat pad was removed    The patella was everted and a peripatellar synovectomy was performed.  We then measured the patella.  It was approximately 22 mm and we performed an approximately 8 mm resection which was confirmed using calipers, and also left adequate bony stock in place to minimize fracture risk.  It sized out to a 32 mm component.  Three lug holes were drilled, and a trial plate was placed.      Access to the intramedullary canals of the femur and tibia were obtained with the intramedullary drill.  The canals were irrigated and suctioned until the effluent was clear, and the intramedullary distal femoral cutting guide was introduced with a 5-degree valgus and 8 mm resection.  The block was pinned to take a standard distal resection.  The distal femur was resected.  We checked the external rotation of the distal femoral sizing block against Wylie s line and the epicondylar axis, and found that 3 degrees of external rotation appeared appropriate.  The drill was then used to fernandez the placement of the 4-in-1 distal femoral cutting block. The femur was measured as a size 4.  At this time, the size 4 cutting block was applied.  We performed the anterior resection followed by the posterior resection, followed by the anterior and posterior chamfer cuts.  The block was removed.  We confirmed that our cuts were all rotationally appropriate and complete.  All osteophytes were removed with a rongeur. Having completed the cuts on the femur, we moved on to the tibia.      The tibial canal was again suctioned and the intramedullary proximal tibial cutting guide was introduced with a 3-degree posterior slope cutting block with neutral varus/valgus.  The block was pinned at a depth to take roughly 2 mm off the medial side, 10 mm off the anterolateral side.  The block was pinned in place and extramedullary drop tiffanie was used to confirm the cut and a cross pin placed.  The proximal tibia was resected and was checked to be  accurate.  At this point, the posterior part of the femur was checked for overhanging osteophytes and these were removed.  Additional meniscal remnants were debrided.  The tibia was measured to a size 3 component.  Rotation was placed and the keel punch was applied.      Posterior cruciate ligament integrity was checked and appeared to be intact.  A trial reduction was then performed with a size 4 cruciate-retaining femoral component, a size 3 tibial component, and 9 mm CR insert.      It appeared to be symmetrically loose in flexion and extension, so we went up to the 12 mm insert.  At this point flexion and extension gaps appeared well balanced, medial and lateral sides appeared well balanced, and the posterior cruciate ligament integrity appeared intact.  We then placed the patellar trial button.  Patellar tracking was now anatomic with no-thumbs technique.  Having confirmed full extension, full flexion, good ligament balancing and normal patellar tracking, we removed the trial components and prepared our cement.     A bone plug was placed in both the femoral and the tibial canals to prevent cement migration.  We injected a ene-articular soft tissue anesthetic along soft tissue planes (Ropivacaine 300mg, Ketorolac 30mg, Epinephrine 0.6mg in 0.9% NACL to make 100mL).  Cut surface of the bone was irrigated and dried. The tibial component was then cemented into place (using 2 bags of simplex low viscosity bone cement with 1.2g Tobramycin per bag).  Excess cement was removed.  The permanent insert was snapped into place.  The size 4 CR femoral component was then cemented into place.  Excess cement was removed.  The knee was brought into extension and the 32 mm patellar button was cemented in place.  The knee was compressed in full extension with excess cement removed.  When the cement was hardened we rechecked our kinematic alignment, confirming full extension, full flexion, and good coronal stability at full  extension, mid flexion, and 90 degrees flexion.  We confirmed intact posterior cruciate ligament integrity.  We confirmed all excess cement had been removed.  The wound was thoroughly irrigated with a 3 minute dilute betadine soak followed by pulsatile lavage with approximately 3 L normal saline.  The tourniquet was released, and hemostasis achieved with electrocautery. 1g of Vancomycin powder was placed within the wound. The arthrotomy was closed with interrupted #2 Ethibond and a #1 stratafix.  Subcutaneous tissue closed in layers with interrupted 0 Vicryl, 2-0 Vicryl for deep dermal sutures, and the skin was closed with running 3-0 monocryl and surgical glue.  Sterile dressings were applied. The patient was awakened from general anesthesia and taken to the recovery room in stable condition.      Sponge, needle, and instrument counts were correct at the conclusion of the procedure. The patient has palpable distal pulses in both lower extremity.     Postoperative Plan:  Pain Control: Continue per pain protocol.  Weight Bearing: Weight bearing as tolerated on affected lower extremity.   DVT Prophylaxis: ASA 81mg BID  Antibiotics: 24 hours of perioperative antibiotics + 7 days of oral antibiotic (Keflex 500mg QID while inpatient, followed by completion of 7 day course with Duricef 500mg BID upon discharge)  GI: Plan for aggressive bowel regimen to prevent constipation from narcotic medications  Lines: HLIV once tolerating PO  PT/OT: Eval and treatment. Will follow up on recommendations.  Discharge plan: to home pending PT    Dispo: stable to pacu    Sean Long MD  Orthopedic Surgery  [facility]

## 2023-11-02 NOTE — PLAN OF CARE
Goal Outcome Evaluation:    A&O x 4. Neuros intact. Pain manageable with medications. Tolerating diet. Ambulated with PT, walker, and gait belt. Adequate urine output. Daughter at bedside. Patient wishes to leave early tomorrow morning if able. Will continue to monitor.

## 2023-11-02 NOTE — ANESTHESIA CARE TRANSFER NOTE
Patient: Savanah Chung    Procedure: Procedure(s):  RIGHT TOTAL KNEE ARTHROPLASTY       Diagnosis: Osteoarthritis of right knee [M17.11]  Diagnosis Additional Information: No value filed.    Anesthesia Type:   Spinal     Note:    Oropharynx: oropharynx clear of all foreign objects and spontaneously breathing  Level of Consciousness: drowsy  Oxygen Supplementation: face mask  Level of Supplemental Oxygen (L/min / FiO2): 6  Independent Airway: airway patency satisfactory and stable  Dentition: dentition unchanged  Vital Signs Stable: post-procedure vital signs reviewed and stable  Report to RN Given: handoff report given  Patient transferred to: PACU    Handoff Report: Identifed the Patient, Identified the Reponsible Provider, Reviewed the pertinent medical history, Discussed the surgical course, Reviewed Intra-OP anesthesia mangement and issues during anesthesia, Set expectations for post-procedure period and Allowed opportunity for questions and acknowledgement of understanding      Vitals:  Vitals Value Taken Time   /73 11/02/23 0936   Temp 98.2 F 11/02/23 0939   Pulse 102 11/02/23 0939   Resp 15 11/02/23 0939   SpO2 100 % 11/02/23 0939   Vitals shown include unfiled device data.    Electronically Signed By: MAXIMO Noel CRNA  November 2, 2023  9:41 AM

## 2023-11-02 NOTE — ANESTHESIA PROCEDURE NOTES
Adductor canal Procedure Note    Pre-Procedure   Staff -        Anesthesiologist:  Brayan Smith MD       Performed By: anesthesiologist       Location: pre-op       Procedure Start/Stop Times: 11/2/2023 7:08 AM and 11/2/2023 7:10 AM       Pre-Anesthestic Checklist: patient identified, IV checked, site marked, risks and benefits discussed, informed consent, monitors and equipment checked, pre-op evaluation, at physician/surgeon's request and post-op pain management  Timeout:       Correct Patient: Yes        Correct Procedure: Yes        Correct Site: Yes        Correct Position: Yes        Correct Laterality: Yes        Site Marked: Yes  Procedure Documentation  Procedure: Adductor canal       Diagnosis: R KNEE PAIN       Laterality: right       Patient Position: supine       Patient Prep/Sterile Barriers: sterile gloves, mask       Skin prep: Chloraprep       Needle Type: insulated       Needle Gauge: 20.        Needle Length (Inches): 6        Ultrasound guided       1. Ultrasound was used to identify targeted nerve, plexus, vascular marker, or fascial plane and place a needle adjacent to it in real-time.       2. Ultrasound was used to visualize the spread of anesthetic in close proximity to the above referenced structure.       3. A permanent image is entered into the patient's record.       4. The visualized anatomic structures appeared normal.       5. There were no apparent abnormal pathologic findings.    Assessment/Narrative         The placement was negative for: blood aspirated, painful injection and site bleeding       Paresthesias: No.       Bolus given via needle..        Secured via.        Insertion/Infusion Method: Single Shot       Complications: none    Medication(s) Administered   Bupivacaine 0.5% PF (Infiltration) - Infiltration   15 mL - 11/2/2023 7:08:00 AM  Medication Administration Time: 11/2/2023 7:08 AM      FOR [facility] ([facility]) ONLY:   Pain Team Contact information: please  "page the Pain Team Via [facility]. Search \"Pain\". During daytime hours, please page the attending first. At night please page the resident first.      "

## 2023-11-02 NOTE — INTERVAL H&P NOTE
"`I have reviewed the surgical (or preoperative) H&P that is linked to this encounter, and examined the patient. There are no significant changes    Clinical Conditions Present on Arrival:  Clinically Significant Risk Factors Present on Admission                  # Severe Obesity: Estimated body mass index is 42.27 kg/m  as calculated from the following:    Height as of this encounter: 1.651 m (5' 5\").    Weight as of this encounter: 115.2 kg (254 lb).       "

## 2023-11-02 NOTE — CONSULTS
[facility] MEDICINE CONSULT NOTE   Physician requesting consult: Sean Long MD    Reason for consult: Postoperative medical management of medical co-morbidities as below    Assessment and Plan    Savanah Chung is a 63 year old female with a history of essential hypertension, DM2, GERD, underwent right total knee arthroplasty.  EBL 50 mill.  Right knee pain is stable.  Hemodynamically stable postoperatively.    Procedure(s):  RIGHT TOTAL KNEE ARTHROPLASTY  Post-operative Day: Day of Surgery    Estimated Blood Loss:  50 mL    Essential hypertension  Losartan 25 mg daily  Hydrochlorothiazide 12.5 mg daily, on hold  Hold antihypertensive if systolic blood pressure is less than 110 as risk of postop hypotension    DM2  Trulicity 1.5 mg once a week  Diabetic diet and insulin sliding scale    GERD  Resume PPI    Morbid obesity Body mass index is 42.27 kg/m .  Modification of lifestyle for weight loss  Outpatient sleep study to rule out obstructive sleep apnea    Status post right total knee arthroplasty  Resume routine postoperative care  Physical and Occupational Therapy  Use incentive spirometry frequently  DVT prophylaxis per orthopedics, aspirin 81 mg twice a day  Pain control with Tylenol 975 mg every 8 hours, 650 mg every 4 hours as needed, oxycodone 5 to 10 mg every 4 hours as needed, IV Dilaudid 0.2 to 0.4 mg every 2 hours as needed      -Reviewed the patient's preoperative H and P and updated missing elements.  -Home medication reconciliation has been reviewed.  Medications have been ordered as noted from the home list and changes are documented above     HISTORY     Savanah Chung is a 63 year old female history of essential hypertension, DM2, GERD, underwent right total knee arthroplasty.  EBL 50 mill.  Right knee pain is stable.  Hemodynamically stable postoperatively.  She was on tramadol 50 to 100 mg every 6 hours as needed for right knee pain prior to surgery.  She is  on losartan 25 mg daily, hydrochlorothiazide 12.5 mg daily for hypertension.  Take Trulicity 1.5 mg once a week for DM2.  On PPI for GERD.  Denies headache, chest pain, breathing difficulty, palpitation, nausea, vomiting, abdominal pain or urinary symptoms.  Does not have history of heart disease, lung disease, bleeding or clotting disorders or sleep apnea.  Preop physical is reviewed.  History is provided by the patient.    Past Medical History     Hypertension  GERD  DM2    Surgical History   She  has no past surgical history on file.   History reviewed. No pertinent surgical history.    Family History    Reviewed.  Both parents had hypertension.  Mother had breast cancer, asthma, kidney disease, dyslipidemia.  Father had leukemia, hypertension, DM2.  Social History    Reviewed, and she  reports that she has never smoked. She has never used smokeless tobacco. She reports that she does not currently use alcohol. She reports current drug use.  Social History     Tobacco Use    Smoking status: Never    Smokeless tobacco: Never   Substance Use Topics    Alcohol use: Not Currently      Allergies     Allergies   Allergen Reactions    Penicillins Shortness Of Breath and Swelling    Amoxicillin Difficulty breathing       Prior to Admission Medications      Medications Prior to Admission   Medication Sig Dispense Refill Last Dose    esomeprazole (NEXIUM) 40 MG DR capsule Take 40 mg by mouth every morning (before breakfast) Take 30-60 minutes before eating.   11/1/2023    hydrochlorothiazide (HYDRODIURIL) 12.5 MG tablet Take 12.5 mg by mouth daily   11/1/2023    losartan (COZAAR) 25 MG tablet Take 25 mg by mouth daily   11/1/2023    traMADol (ULTRAM) 50 MG tablet Take  mg by mouth every 8 hours as needed for severe pain   11/1/2023 at am    dulaglutide (TRULICITY) 1.5 MG/0.5ML pen Inject 1.5 mg Subcutaneous every 7 days Left  message to stop 10/26/23 before surgery   10/25/2023       Review of Systems   A 12 point  comprehensive review of systems was negative except as noted above.    OBJECTIVE         Physical Exam   Temp:  [96.4  F (35.8  C)-98.6  F (37  C)] 96.6  F (35.9  C)  Pulse:  [] 96  Resp:  [14-20] 14  BP: (142-179)/(73-87) 144/76  SpO2:  [98 %-100 %] 98 %  Body mass index is 42.27 kg/m .  GENERAL:  Alert, appears comfortable, in no acute distress, appears stated age, obese   HEAD:  Normocephalic, without obvious abnormality, atraumatic   EYES:  PERRL, conjunctiva  clear,  EOM's intact   NOSE: Nares normal,   mucosa normal, no drainage   THROAT: Lips, mucosa,  gums normal, mouth moist   NECK: Supple, symmetrical, trachea midline   BACK:   Symmetric, no curvature, ROM normal   LUNGS:   Clear to auscultation bilaterally, no rales, rhonchi, or wheezing, symmetric chest rise on inhalation, respirations unlabored   CHEST WALL:  No tenderness or deformity   HEART:  Regular rate and rhythm, S1 and S2 normal, no murmur    ABDOMEN:   Soft, non-tender, bowel sounds active, no masses, no organomegaly, no rebound or guarding   EXTREMITIES: Status post right total knee arthroplasty   SKIN: No rashes   NEURO: Alert, oriented x 3   PSYCH: Cooperative, behavior is appropriate        Imaging Reviewed Personally By Myself    Radiology Results:   Right knee xray  1.  Right total knee arthroplasty with patellar resurfacing. The components are well seated.  2.  No fracture or joint malalignment.  3.  Postoperative intra-articular and soft tissue gas.    Labs Reviewed Personally By Myself     Recent Labs   Lab Test 11/02/23  0938 11/02/23  0637   * 178*   Hemoglobin A1c 8.2  Hemoglobin 14.6  Sodium 140, creatinine 0.96, potassium 3.4  Preoperative Labs Reviewed Personally By Myself     Thank you for this consultation.  Appreciate the opportunity to participate in the care of Savanah MARY GRACE Chung, please feel free to contact us for any questions or concerns.    Alina Mercado MD  [facility]  [facility]  Phone: #[phone]

## 2023-11-02 NOTE — ANESTHESIA PROCEDURE NOTES
"Intrathecal injection Procedure Note    Pre-Procedure   Staff -        Anesthesiologist:  Brayan Smith MD       Performed By: anesthesiologist       Location: OR       Procedure Start/Stop Times: 11/2/2023 7:21 AM and 11/2/2023 7:24 AM       Pre-Anesthestic Checklist: patient identified, IV checked, risks and benefits discussed, informed consent, monitors and equipment checked, pre-op evaluation, at physician/surgeon's request and post-op pain management  Timeout:       Correct Patient: Yes        Correct Procedure: Yes        Correct Site: Yes        Correct Position: Yes   Procedure Documentation  Procedure: intrathecal injection       Diagnosis: R knee pain       Patient Position: sitting       Patient Prep/Sterile Barriers: sterile gloves, mask, patient draped       Skin prep: Chloraprep       Insertion Site: L3-4. (midline approach).       Needle Gauge: 24.        Needle Length (Inches): 3.5        Spinal Needle Type: Pencan       Introducer used       Introducer: 20 G       # of attempts: 1 and  # of redirects:  1    Assessment/Narrative         Paresthesias: No.       CSF fluid: clear.       Opening pressure was cmH2O while  Sitting.      Medication(s) Administered   0.5% Bupivacaine PF (Intrathecal) - Intrathecal   2.6 mL - 11/2/2023 7:21:00 AM  Medication Administration Time: 11/2/2023 7:21 AM      FOR [facility] ([facility] ONLY:   Pain Team Contact information: please page the Pain Team Via [place] [employer]. Search \"Pain\". During daytime hours, please page the attending first. At night please page the resident first.      "

## 2023-11-02 NOTE — ANESTHESIA POSTPROCEDURE EVALUATION
Patient: Savanah Chung    Procedure: Procedure(s):  RIGHT TOTAL KNEE ARTHROPLASTY       Anesthesia Type:  Spinal    Note:  Disposition: Outpatient   Postop Pain Control: Uneventful            Sign Out: Well controlled pain   PONV: No   Neuro/Psych: Uneventful            Sign Out: Acceptable/Baseline neuro status   Airway/Respiratory: Uneventful            Sign Out: Acceptable/Baseline resp. status   CV/Hemodynamics: Uneventful            Sign Out: Acceptable CV status; No obvious hypovolemia; No obvious fluid overload   Other NRE: NONE   DID A NON-ROUTINE EVENT OCCUR? No           Last vitals:  Vitals Value Taken Time   /74 11/02/23 1100   Temp 29.6  C (85.28  F) 11/02/23 0958   Pulse 95 11/02/23 1124   Resp 16 11/02/23 1100   SpO2 94 % 11/02/23 1124   Vitals shown include unfiled device data.    Electronically Signed By: Brayan Smith MD  November 2, 2023  3:39 PM

## 2023-11-03 ENCOUNTER — APPOINTMENT (OUTPATIENT)
Dept: PHYSICAL THERAPY | Facility: CLINIC | Age: 63
End: 2023-11-03
Attending: STUDENT IN AN ORGANIZED HEALTH CARE EDUCATION/TRAINING PROGRAM
Payer: COMMERCIAL

## 2023-11-03 ENCOUNTER — APPOINTMENT (OUTPATIENT)
Dept: OCCUPATIONAL THERAPY | Facility: CLINIC | Age: 63
End: 2023-11-03
Attending: STUDENT IN AN ORGANIZED HEALTH CARE EDUCATION/TRAINING PROGRAM
Payer: COMMERCIAL

## 2023-11-03 LAB
ANION GAP SERPL CALCULATED.3IONS-SCNC: 8 MMOL/L (ref 7–15)
BUN SERPL-MCNC: 13.5 MG/DL (ref 8–23)
CALCIUM SERPL-MCNC: 8.4 MG/DL (ref 8.8–10.2)
CHLORIDE SERPL-SCNC: 98 MMOL/L (ref 98–107)
CREAT SERPL-MCNC: 0.73 MG/DL (ref 0.51–0.95)
DEPRECATED HCO3 PLAS-SCNC: 28 MMOL/L (ref 22–29)
EGFRCR SERPLBLD CKD-EPI 2021: >90 ML/MIN/1.73M2
ERYTHROCYTE [DISTWIDTH] IN BLOOD BY AUTOMATED COUNT: 12.4 % (ref 10–15)
GLUCOSE BLDC GLUCOMTR-MCNC: 175 MG/DL (ref 70–99)
GLUCOSE BLDC GLUCOMTR-MCNC: 215 MG/DL (ref 70–99)
GLUCOSE BLDC GLUCOMTR-MCNC: 224 MG/DL (ref 70–99)
GLUCOSE BLDC GLUCOMTR-MCNC: 232 MG/DL (ref 70–99)
GLUCOSE SERPL-MCNC: 219 MG/DL (ref 70–99)
HCT VFR BLD AUTO: 39 % (ref 35–47)
HGB BLD-MCNC: 12.9 G/DL (ref 11.7–15.7)
MCH RBC QN AUTO: 29.1 PG (ref 26.5–33)
MCHC RBC AUTO-ENTMCNC: 33.1 G/DL (ref 31.5–36.5)
MCV RBC AUTO: 88 FL (ref 78–100)
PLATELET # BLD AUTO: 222 10E3/UL (ref 150–450)
POTASSIUM SERPL-SCNC: 3.9 MMOL/L (ref 3.4–5.3)
RBC # BLD AUTO: 4.43 10E6/UL (ref 3.8–5.2)
SODIUM SERPL-SCNC: 134 MMOL/L (ref 135–145)
WBC # BLD AUTO: 10 10E3/UL (ref 4–11)

## 2023-11-03 PROCEDURE — 250N000011 HC RX IP 250 OP 636: Mod: JZ | Performed by: STUDENT IN AN ORGANIZED HEALTH CARE EDUCATION/TRAINING PROGRAM

## 2023-11-03 PROCEDURE — 99232 SBSQ HOSP IP/OBS MODERATE 35: CPT | Performed by: FAMILY MEDICINE

## 2023-11-03 PROCEDURE — 250N000013 HC RX MED GY IP 250 OP 250 PS 637: Performed by: STUDENT IN AN ORGANIZED HEALTH CARE EDUCATION/TRAINING PROGRAM

## 2023-11-03 PROCEDURE — 97530 THERAPEUTIC ACTIVITIES: CPT | Mod: GP

## 2023-11-03 PROCEDURE — 250N000013 HC RX MED GY IP 250 OP 250 PS 637: Performed by: FAMILY MEDICINE

## 2023-11-03 PROCEDURE — 36415 COLL VENOUS BLD VENIPUNCTURE: CPT | Performed by: FAMILY MEDICINE

## 2023-11-03 PROCEDURE — 250N000013 HC RX MED GY IP 250 OP 250 PS 637: Performed by: PHYSICIAN ASSISTANT

## 2023-11-03 PROCEDURE — 80048 BASIC METABOLIC PNL TOTAL CA: CPT | Performed by: FAMILY MEDICINE

## 2023-11-03 PROCEDURE — 97535 SELF CARE MNGMENT TRAINING: CPT | Mod: GO

## 2023-11-03 PROCEDURE — 258N000003 HC RX IP 258 OP 636: Performed by: STUDENT IN AN ORGANIZED HEALTH CARE EDUCATION/TRAINING PROGRAM

## 2023-11-03 PROCEDURE — 97116 GAIT TRAINING THERAPY: CPT | Mod: GP

## 2023-11-03 PROCEDURE — 85027 COMPLETE CBC AUTOMATED: CPT | Performed by: FAMILY MEDICINE

## 2023-11-03 PROCEDURE — 82962 GLUCOSE BLOOD TEST: CPT

## 2023-11-03 PROCEDURE — 97165 OT EVAL LOW COMPLEX 30 MIN: CPT | Mod: GO

## 2023-11-03 RX ORDER — HYDROMORPHONE HYDROCHLORIDE 2 MG/1
2-4 TABLET ORAL EVERY 4 HOURS PRN
Status: DISCONTINUED | OUTPATIENT
Start: 2023-11-03 | End: 2023-11-04

## 2023-11-03 RX ADMIN — HYDROXYZINE HYDROCHLORIDE 25 MG: 25 TABLET ORAL at 07:59

## 2023-11-03 RX ADMIN — INSULIN ASPART 1 UNITS: 100 INJECTION, SOLUTION INTRAVENOUS; SUBCUTANEOUS at 07:11

## 2023-11-03 RX ADMIN — HYDROCODONE BITARTRATE AND ACETAMINOPHEN 2 TABLET: 5; 325 TABLET ORAL at 03:29

## 2023-11-03 RX ADMIN — HYDROMORPHONE HYDROCHLORIDE 0.4 MG: 0.2 INJECTION, SOLUTION INTRAMUSCULAR; INTRAVENOUS; SUBCUTANEOUS at 00:50

## 2023-11-03 RX ADMIN — SENNOSIDES AND DOCUSATE SODIUM 1 TABLET: 8.6; 5 TABLET ORAL at 20:05

## 2023-11-03 RX ADMIN — SENNOSIDES AND DOCUSATE SODIUM 1 TABLET: 8.6; 5 TABLET ORAL at 07:59

## 2023-11-03 RX ADMIN — INSULIN ASPART 2 UNITS: 100 INJECTION, SOLUTION INTRAVENOUS; SUBCUTANEOUS at 12:11

## 2023-11-03 RX ADMIN — HYDROMORPHONE HYDROCHLORIDE 4 MG: 2 TABLET ORAL at 20:05

## 2023-11-03 RX ADMIN — LOSARTAN POTASSIUM 25 MG: 25 TABLET, FILM COATED ORAL at 08:12

## 2023-11-03 RX ADMIN — INSULIN ASPART 2 UNITS: 100 INJECTION, SOLUTION INTRAVENOUS; SUBCUTANEOUS at 18:19

## 2023-11-03 RX ADMIN — HYDROMORPHONE HYDROCHLORIDE 0.4 MG: 0.2 INJECTION, SOLUTION INTRAMUSCULAR; INTRAVENOUS; SUBCUTANEOUS at 22:40

## 2023-11-03 RX ADMIN — HYDROXYZINE HYDROCHLORIDE 25 MG: 25 TABLET ORAL at 20:06

## 2023-11-03 RX ADMIN — ASPIRIN 81 MG: 81 TABLET, COATED ORAL at 07:59

## 2023-11-03 RX ADMIN — SODIUM CHLORIDE, POTASSIUM CHLORIDE, SODIUM LACTATE AND CALCIUM CHLORIDE: 600; 310; 30; 20 INJECTION, SOLUTION INTRAVENOUS at 02:00

## 2023-11-03 RX ADMIN — HYDROMORPHONE HYDROCHLORIDE 0.4 MG: 0.2 INJECTION, SOLUTION INTRAMUSCULAR; INTRAVENOUS; SUBCUTANEOUS at 07:59

## 2023-11-03 RX ADMIN — HYDROMORPHONE HYDROCHLORIDE 2 MG: 2 TABLET ORAL at 12:06

## 2023-11-03 RX ADMIN — ASPIRIN 81 MG: 81 TABLET, COATED ORAL at 20:05

## 2023-11-03 RX ADMIN — HYDROMORPHONE HYDROCHLORIDE 4 MG: 2 TABLET ORAL at 16:01

## 2023-11-03 ASSESSMENT — ACTIVITIES OF DAILY LIVING (ADL)
ADLS_ACUITY_SCORE: 36
IADL_COMMENTS: IND FOR IADLS
ADLS_ACUITY_SCORE: 36

## 2023-11-03 NOTE — PROGRESS NOTES
[facility] MEDICINE  PROGRESS NOTE     Code Status: Full Code  Procedure(s):  RIGHT TOTAL KNEE ARTHROPLASTY  1 Day Post-Op  Identification/Summary:     Savanah Chung is a 63 year old female with a history of essential hypertension, DM2, GERD, underwent right total knee arthroplasty.  EBL 50 mill.  Right knee pain is stable.  Hemodynamically stable postoperatively.  Blood sugars elevated but improving.       Essential hypertension  Losartan 25 mg daily  Hydrochlorothiazide 12.5 mg daily, on hold  Hold antihypertensive if systolic blood pressure is less than 110 as risk of postop hypotension.     DM2  Preoperative A1c 8.2.  Had just recently had her Trulicity increased.  Trulicity 1.5 mg once a week  Diabetic diet and insulin sliding scale  Reasonable postoperative glucose control.  Follow closely at discharge.     GERD  Resume PPI     Morbid obesity Body mass index is 42.27 kg/m .  Modification of lifestyle for weight loss  Outpatient sleep study to rule out obstructive sleep apnea    Hyponatremia  Mild sodium level dropped down to 134.  No further checks indicated.     Status post right total knee arthroplasty  Postoperative management per orthopedics.      Anticoagulation   Aspirin 81 mg twice daily per orthopedics.    COVID-19 PCR not tested    Fluids: Saline lock  Pain meds: Per surgery  Therapy: Per surgery  Bell:Not present  Lines: None       Current Diet  Orders Placed This Encounter      Advance Diet as Tolerated: Regular Diet Adult      Discharge Instruction - Regular Diet Adult    Supplements  None    Barriers to Discharge: Medically stable, therapy/surgical clearance    Disposition: Per orthopedics    Clinically Significant Risk Factors Present on Admission                  # Hypertension: Home medication list includes antihypertensive(s)      # Severe Obesity: Estimated body mass index is 42.27 kg/m  as calculated from the following:    Height as of this encounter:  "1.651 m (5' 5\").    Weight as of this encounter: 115.2 kg (254 lb).              Interval History/Subjective:  Patient fatigue did not get much sleep last night.  No chest pain.  No shortness of breath.  No nausea or vomiting.  A1c was 8.2.  Notes that her Trulicity was just recently increased.  Surgical sugars were in the 200s this morning down to 175.  Verbalized understanding of importance of glucose control as it pertains to surgical healing.  Questions answered to verbalized satisfaction.      Last 24H PRN:     HYDROmorphone (DILAUDID) injection 0.2 mg, 0.2 mg at 11/02/23 3832 **OR** HYDROmorphone (DILAUDID) injection 0.4 mg, 0.4 mg at 11/03/23 0759    hydrOXYzine (ATARAX) tablet 25 mg, 25 mg at 11/03/23 0759    Physical Exam/Objective:  Temp:  [97.5  F (36.4  C)-97.9  F (36.6  C)] 97.5  F (36.4  C)  Pulse:  [] 100  Resp:  [16-18] 18  BP: (122-147)/(53-79) 143/53  SpO2:  [94 %-99 %] 95 %  Wt Readings from Last 4 Encounters:   11/02/23 115.2 kg (254 lb)     Body mass index is 42.27 kg/m .    Constitutional: awake, alert, cooperative, no apparent distress, and appears stated age and morbidly obese  ENT: Normocephalic, without obvious abnormality, atraumatic, external ears without lesions, oral pharynx with moist mucous membranes, tonsils without erythema or exudates, gums normal and good dentition.  Respiratory: No increased work of breathing, good air exchange, clear to auscultation bilaterally, no crackles or wheezing  Cardiovascular: Normal apical impulse, regular rate and rhythm, normal S1 and S2, no S3 or S4, and no murmur noted  GI: No scars, normal bowel sounds, soft, non-distended, non-tender, no masses palpated, no hepatosplenomegally  Skin: normal skin color, texture, turgor, no redness, warmth, or swelling, and no rashes  Musculoskeletal: Limited lower extremity exam due to postoperative status.  Surgical dressing over right knee remains intact.  Otherwise no redness, warmth, or swelling of the " joints.  Motor strength Tone is normal. no lower extremity pitting edema present  Neurologic: Cranial nerves II-XII are grossly intact. Sensory:  Sensory intact  Neuropsychiatric: General: normal, calm, and normal eye contact Level of consciousness: alert / normal Affect: normal Orientation: oriented to self, place, time and situation Memory and insight: normal, memory for past and recent events intact, and thought process normal      Medications:   Personally Reviewed.  Medications    lactated ringers 75 mL/hr at 11/03/23 0200      aspirin  81 mg Oral BID    insulin aspart  1-7 Units Subcutaneous TID AC    losartan  25 mg Oral Daily    pantoprazole  40 mg Oral QAM AC    polyethylene glycol  17 g Oral Daily    senna-docusate  1 tablet Oral BID    sodium chloride (PF)  3 mL Intracatheter Q8H       Data reviewed today: I personally reviewed all new medications, labs, imaging/diagnostics reports over the past 24 hours. Pertinent findings include:    Imaging:   Recent Results (from the past 24 hour(s))   XR Knee Port Right 1/2 Views    Narrative    EXAM: XR KNEE PORT RIGHT 1/2 VIEWS  LOCATION: [facility]  DATE: 11/2/2023    INDICATION: Right-sided knee pain.  COMPARISON: None.      Impression    IMPRESSION:  1.  Right total knee arthroplasty with patellar resurfacing. The components are well seated.  2.  No fracture or joint malalignment.  3.  Postoperative intra-articular and soft tissue gas.       Labs:  XR Knee Port Right 1/2 Views   Final Result   IMPRESSION:   1.  Right total knee arthroplasty with patellar resurfacing. The components are well seated.   2.  No fracture or joint malalignment.   3.  Postoperative intra-articular and soft tissue gas.      POC US Guidance Needle Placement   Final Result        Recent Results (from the past 24 hour(s))   Glucose by meter    Collection Time: 11/02/23 11:59 AM   Result Value Ref Range    GLUCOSE BY METER POCT 271 (H) 70 - 99 mg/dL   Glucose by  meter    Collection Time: 11/02/23  5:32 PM   Result Value Ref Range    GLUCOSE BY METER POCT 258 (H) 70 - 99 mg/dL   Glucose by meter    Collection Time: 11/02/23  8:41 PM   Result Value Ref Range    GLUCOSE BY METER POCT 213 (H) 70 - 99 mg/dL   Glucose by meter    Collection Time: 11/03/23  6:41 AM   Result Value Ref Range    GLUCOSE BY METER POCT 175 (H) 70 - 99 mg/dL   CBC with platelets    Collection Time: 11/03/23  8:48 AM   Result Value Ref Range    WBC Count 10.0 4.0 - 11.0 10e3/uL    RBC Count 4.43 3.80 - 5.20 10e6/uL    Hemoglobin 12.9 11.7 - 15.7 g/dL    Hematocrit 39.0 35.0 - 47.0 %    MCV 88 78 - 100 fL    MCH 29.1 26.5 - 33.0 pg    MCHC 33.1 31.5 - 36.5 g/dL    RDW 12.4 10.0 - 15.0 %    Platelet Count 222 150 - 450 10e3/uL   Basic metabolic panel    Collection Time: 11/03/23  8:48 AM   Result Value Ref Range    Sodium 134 (L) 135 - 145 mmol/L    Potassium 3.9 3.4 - 5.3 mmol/L    Chloride 98 98 - 107 mmol/L    Carbon Dioxide (CO2) 28 22 - 29 mmol/L    Anion Gap 8 7 - 15 mmol/L    Urea Nitrogen 13.5 8.0 - 23.0 mg/dL    Creatinine 0.73 0.51 - 0.95 mg/dL    GFR Estimate >90 >60 mL/min/1.73m2    Calcium 8.4 (L) 8.8 - 10.2 mg/dL    Glucose 219 (H) 70 - 99 mg/dL       Pending Labs:  Unresulted Labs Ordered in the Past 30 Days of this Admission       No orders found for last 31 day(s).              Morris Roberson MD  [facility]  Phone: #[phone]

## 2023-11-03 NOTE — PLAN OF CARE
Problem: Plan of Care - These are the overarching goals to be used throughout the patient stay.    Goal: Optimal Comfort and Wellbeing  Outcome: Progressing  Intervention: Monitor Pain and Promote Comfort  Recent Flowsheet Documentation  Taken 11/3/2023 0329 by Sidra Santoyo RN  Pain Management Interventions:   medication (see MAR)   cold applied       Goal Outcome Evaluation:    Patient complains of pain and managed per MAR. Restless throughout shift. Standby assist with walker, refuses gait belt. Calls appropriately with call light within reach.

## 2023-11-03 NOTE — PLAN OF CARE
Goal Outcome Evaluation:      Plan of Care Reviewed With: patient    Overall Patient Progress: improvingOverall Patient Progress: improving    Outcome Evaluation: Working on pain management today, observing for improvement.  Has not slept today, did not sleep last night, likely exacerbating pain mgmt.  Amb w/ FWW w/ steady gait.  Dressing CDI no drainage noted, no sx/s of infection.    Patient vital signs are at baseline: Yes  Patient able to ambulate as they were prior to admission or with assist devices provided by therapies during their stay:  Yes  Patient MUST void prior to discharge:  Yes  Patient able to tolerate oral intake:  Yes  Pain has adequate pain control using Oral analgesics:  Yes Increased pain mgmt plan of care this shift.  Does patient have an identified :  Yes  Has goal D/C date and time been discussed with patient:  Yes

## 2023-11-03 NOTE — PROGRESS NOTES
"Orthopedic Progress Note      Assessment: 1 Day Post-Op  S/P Procedure(s):  RIGHT TOTAL KNEE ARTHROPLASTY @    Plan:   - Continue PT/OT  - Weightbearing status: as tolerated  - Anticoagulation: ASA in addition to SCDs, rekha stockings and early ambulation.  - Discharge planning: Plan for patient to stay tonight for ongoing pain management and safety with mobility.       Subjective:  Pain: mild/moderate around surgical site  Chest pain, SOB: none  Nausea, Vomiting:  no  Lightheadedness, Dizziness:  no  Neuro:  Patient denies new onset numbness or paresthesias    Patient is having variable level of pain around surgical site. More than she anticipated. Making it difficult for her activity/mobility. Medication is helping but level of pain a bit higher than she is able to tolerate. Voiding and tolerating diet. No weakness numbness/tingling into her leg.     Objective:  BP (!) 159/70 (BP Location: Right arm)   Pulse 104   Temp 98.9  F (37.2  C) (Oral)   Resp 18   Ht 1.651 m (5' 5\")   Wt 115.2 kg (254 lb)   SpO2 95%   BMI 42.27 kg/m    The patient is A&Ox3. Appears comfortable.   Sensation is intact.  Dorsiflexion and plantar flexion is intact.  Dorsalis pedis pulse intact.  Calves are soft and non-tender. Negative Valentin's.  The incision is covered. Dressing C/D/I.    No drain     Pertinent Labs   Lab Results: personally reviewed.   No results found for: \"INR\", \"PROTIME\"  Lab Results   Component Value Date    WBC 10.0 11/03/2023    HGB 12.9 11/03/2023    HCT 39.0 11/03/2023    MCV 88 11/03/2023     11/03/2023     Lab Results   Component Value Date     (L) 11/03/2023    CO2 28 11/03/2023         Report completed by:  Clarke Snow PA-C/Dr. Long  [facility]    Date: 11/3/2023  Time: 1:45 PM    "

## 2023-11-03 NOTE — PROGRESS NOTES
Care Management Follow Up    Length of Stay (days): 0    Expected Discharge Date: 11/03/2023     Concerns to be Addressed:     medical progression  Patient plan of care discussed at interdisciplinary rounds: Yes    Anticipated Discharge Disposition:  home      Anticipated Discharge Services:  none   Anticipated Discharge DME:  per treatment team     Patient/family educated on Medicare website which has current facility and service quality ratings:  NA  Education Provided on the Discharge Plan:  yes  Patient/Family in Agreement with the Plan:  yes    Referrals Placed by CM/SW:  none required at this time   Private pay costs discussed: Not applicable    Additional Information:  SW reviewed chart. Pt from home with daughter. Pt is independent at baseline.  Anticipate Pt will return home to prior living environment with support as needed from daughter. No CM needs identified at this time.  Family to transport.     CORDELL Gonzalez

## 2023-11-03 NOTE — PROGRESS NOTES
Patient does not feel ready for discharge.  Addressing pain mgmt w/ ortho team.  Patient to receive her first dose of oral dilaudid for pain at this time.  Therapy did sign off, but concerns related to pain.  Ortho aware.

## 2023-11-03 NOTE — PROGRESS NOTES
11/03/23 0945   Appointment Info   Signing Clinician's Name / Credentials (OT) Oma Patrick OTR/L   Quick Adds   Quick Adds Certification   Living Environment   People in Home child(tristin), adult  (reports daughter won't be able to assist her with any ADLs or IADLs)   Current Living Arrangements house   Living Environment Comments tub shower, low toilet   Self-Care   Equipment Currently Used at Home none  (has 4WW)   Activity/Exercise/Self-Care Comment ind for BADLs   Instrumental Activities of Daily Living (IADL)   IADL Comments ind for IADLs   General Information   Onset of Illness/Injury or Date of Surgery 11/02/23   Referring Physician Sean Long MD   Patient/Family Therapy Goal Statement (OT) less pain   Additional Occupational Profile Info/Pertinent History of Current Problem s/p R TKA   Existing Precautions/Restrictions no pivoting or twisting   Right Lower Extremity (Weight-bearing Status) weight-bearing as tolerated (WBAT)   Cognitive Status Examination   Affect/Mental Status (Cognitive) WFL   Follows Commands WFL   Pain Assessment   Patient Currently in Pain Yes, see Vital Sign flowsheet   Range of Motion Comprehensive   General Range of Motion bilateral upper extremity ROM WFL   Strength Comprehensive (MMT)   General Manual Muscle Testing (MMT) Assessment no strength deficits identified   Bed Mobility   Bed Mobility supine-sit;sit-supine   Supine-Sit [place] (Bed Mobility) moderate assist (50% patient effort)   Sit-Supine [place] (Bed Mobility) moderate assist (50% patient effort)   Transfers   Transfers sit-stand transfer;toilet transfer;shower transfer   Sit-Stand Transfer   Sit-Stand [place] (Transfers) supervision   Shower Transfer   Shower Transfer Comments Defer - pt unable to tolerate 100% weight on RLE   Toilet Transfer   Type (Toilet Transfer) sit-stand;stand-sit   Toilet Transfer Comments unable to attempt regular toilet transfer d/t pain - CGA with VC for commode  transfer   Activities of Daily Living   BADL Assessment/Intervention lower body dressing;toileting   Lower Body Dressing Assessment/Training   Leslie Level (Lower Body Dressing) maximum assist (25% patient effort)   Toileting   Leslie Level (Toileting) contact guard assist;verbal cues   Clinical Impression   Criteria for Skilled Therapeutic Interventions Met (OT) Yes, treatment indicated   OT Diagnosis dec BADL indep   OT Problem List-Impairments impacting ADL problems related to;activity tolerance impaired;mobility;pain;post-surgical precautions   Assessment of Occupational Performance 5 or more Performance Deficits   Identified Performance Deficits dressing, toileting, bathing, household mobility, functional transfers, meal preparation, household management   Planned Therapy Interventions (OT) ADL retraining;bed mobility training;transfer training;progressive activity/exercise   Clinical Decision Making Complexity (OT) problem focused assessment/low complexity   Risk & Benefits of therapy have been explained evaluation/treatment results reviewed;participants included;patient   OT Total Evaluation Time   OT Eval, Low Complexity Minutes (20412) 10   Therapy Certification   Medical Diagnosis TKA   Start of Care Date 11/03/23   Certification date from 11/03/23   Certification date to 11/10/23   OT Goals   Therapy Frequency (OT) Daily   OT Predicted Duration/Target Date for Goal Attainment 11/06/23   OT Goals Lower Body Dressing;Bed Mobility;Transfers;Toilet Transfer/Toileting   OT: Lower Body Dressing Supervision/stand-by assist;using adaptive equipment   OT: Bed Mobility Modified independent;supine to/from sitting   OT: Transfer Supervision/stand-by assist  (tub transfer)   OT: Toilet Transfer/Toileting Supervision/stand-by assist;toilet transfer;cleaning and garment management  (RTS with bilateral supports)   Self-Care/Home Management   Self-Care/Home Mgmt/ADL, Compensatory, Meal Prep Minutes (18025) 42    Symptoms Noted During/After Treatment (Meal Preparation/Planning Training) increased pain   Treatment Detail/Skilled Intervention Educ pt on techniques to decrease pain for LB dressing w AE, toileting/transfers, bed mob w leg , car transfers, and shower transfer techniques. Following educ, mod IND for supine <> sitting EOB using leg . Following educ, SBA with extra time to don/doff socks, shoes, and shorts using reacher, sock aid, and long shoe horn. Following educ, SBA for commode transfer (simulating raised toilet seat with bilateral armrests), clothing managemenet, and pericares. Extended time for all tasks d/t significant pain, moving slowly, requires rest breaks.   OT Discharge Planning   OT Plan attempt tub transfer if pain control is improved   OT Discharge Recommendation (DC Rec)   (defer to ortho)   OT Rationale for DC Rec pain is biggest limitor but able to complete BADLs with AE   OT Brief overview of current status SBA BADLs w AE, unable to attempt shower transfer d/t pain   OT Equipment Needed at Discharge dressing equipment;raised toilet seat;reacher  (long handle shoe horn, sock aid, reacher, leg , RTS with bilateral armrests)   Total Session Time   Timed Code Treatment Minutes 42   Total Session Time (sum of timed and untimed services) 52    [facility]  OUTPATIENT OCCUPATIONAL THERAPY  EVALUATION  PLAN OF TREATMENT FOR OUTPATIENT REHABILITATION  (COMPLETE FOR INITIAL CLAIMS ONLY)  Patient's Last Name, First Name, M.I.  YOB: 1960  Savanah Chung                          Provider's Name  [facility] Medical Record No.  [id number]                             Onset Date:  11/02/23   Start of Care Date:  11/03/23   Type:     ___PT   _X_OT   ___SLP Medical Diagnosis:  TKA                    OT Diagnosis:  dec BADL indep Visits from SOC:  1     See note for plan of treatment, functional goals and certification  details    I CERTIFY THE NEED FOR THESE SERVICES FURNISHED UNDER        THIS PLAN OF TREATMENT AND WHILE UNDER MY CARE     (Physician co-signature of this document indicates review and certification of the therapy plan).

## 2023-11-03 NOTE — PLAN OF CARE
Physical Therapy Discharge Summary    Reason for therapy discharge:    All goals and outcomes met, no further needs identified.    Progress towards therapy goal(s). See goals on Care Plan in [facility] electronic health record for goal details.  Goals met    Therapy recommendation(s):    Continued therapy is recommended.  Rationale/Recommendations:  continued PT with outpatient PT to improve functional mobility.  Continue home exercise program.

## 2023-11-04 ENCOUNTER — APPOINTMENT (OUTPATIENT)
Dept: OCCUPATIONAL THERAPY | Facility: CLINIC | Age: 63
End: 2023-11-04
Attending: STUDENT IN AN ORGANIZED HEALTH CARE EDUCATION/TRAINING PROGRAM
Payer: COMMERCIAL

## 2023-11-04 LAB
GLUCOSE BLDC GLUCOMTR-MCNC: 195 MG/DL (ref 70–99)
GLUCOSE BLDC GLUCOMTR-MCNC: 209 MG/DL (ref 70–99)
GLUCOSE BLDC GLUCOMTR-MCNC: 218 MG/DL (ref 70–99)
GLUCOSE BLDC GLUCOMTR-MCNC: 223 MG/DL (ref 70–99)
HGB BLD-MCNC: 12.8 G/DL (ref 11.7–15.7)

## 2023-11-04 PROCEDURE — 250N000012 HC RX MED GY IP 250 OP 636 PS 637: Performed by: FAMILY MEDICINE

## 2023-11-04 PROCEDURE — 250N000013 HC RX MED GY IP 250 OP 250 PS 637: Performed by: PHYSICIAN ASSISTANT

## 2023-11-04 PROCEDURE — 85018 HEMOGLOBIN: CPT | Performed by: STUDENT IN AN ORGANIZED HEALTH CARE EDUCATION/TRAINING PROGRAM

## 2023-11-04 PROCEDURE — 99214 OFFICE O/P EST MOD 30 MIN: CPT | Performed by: FAMILY MEDICINE

## 2023-11-04 PROCEDURE — 250N000013 HC RX MED GY IP 250 OP 250 PS 637: Performed by: STUDENT IN AN ORGANIZED HEALTH CARE EDUCATION/TRAINING PROGRAM

## 2023-11-04 PROCEDURE — 82962 GLUCOSE BLOOD TEST: CPT

## 2023-11-04 PROCEDURE — 97535 SELF CARE MNGMENT TRAINING: CPT | Mod: GO

## 2023-11-04 PROCEDURE — 36415 COLL VENOUS BLD VENIPUNCTURE: CPT | Performed by: STUDENT IN AN ORGANIZED HEALTH CARE EDUCATION/TRAINING PROGRAM

## 2023-11-04 PROCEDURE — 250N000013 HC RX MED GY IP 250 OP 250 PS 637: Performed by: FAMILY MEDICINE

## 2023-11-04 RX ORDER — HYDROMORPHONE HYDROCHLORIDE 2 MG/1
2-4 TABLET ORAL
Status: DISCONTINUED | OUTPATIENT
Start: 2023-11-04 | End: 2023-11-05 | Stop reason: HOSPADM

## 2023-11-04 RX ADMIN — ASPIRIN 81 MG: 81 TABLET, COATED ORAL at 21:07

## 2023-11-04 RX ADMIN — SENNOSIDES AND DOCUSATE SODIUM 1 TABLET: 8.6; 5 TABLET ORAL at 21:07

## 2023-11-04 RX ADMIN — SENNOSIDES AND DOCUSATE SODIUM 1 TABLET: 8.6; 5 TABLET ORAL at 09:05

## 2023-11-04 RX ADMIN — HYDROXYZINE HYDROCHLORIDE 25 MG: 25 TABLET ORAL at 06:59

## 2023-11-04 RX ADMIN — HYDROMORPHONE HYDROCHLORIDE 4 MG: 2 TABLET ORAL at 17:32

## 2023-11-04 RX ADMIN — HYDROMORPHONE HYDROCHLORIDE 4 MG: 2 TABLET ORAL at 11:30

## 2023-11-04 RX ADMIN — HYDROXYZINE HYDROCHLORIDE 25 MG: 25 TABLET ORAL at 21:07

## 2023-11-04 RX ADMIN — INSULIN ASPART 2 UNITS: 100 INJECTION, SOLUTION INTRAVENOUS; SUBCUTANEOUS at 17:33

## 2023-11-04 RX ADMIN — HYDROXYZINE HYDROCHLORIDE 25 MG: 25 TABLET ORAL at 13:00

## 2023-11-04 RX ADMIN — HYDROMORPHONE HYDROCHLORIDE 4 MG: 2 TABLET ORAL at 00:56

## 2023-11-04 RX ADMIN — INSULIN ASPART 2 UNITS: 100 INJECTION, SOLUTION INTRAVENOUS; SUBCUTANEOUS at 12:57

## 2023-11-04 RX ADMIN — ASPIRIN 81 MG: 81 TABLET, COATED ORAL at 09:05

## 2023-11-04 RX ADMIN — LOSARTAN POTASSIUM 25 MG: 25 TABLET, FILM COATED ORAL at 09:06

## 2023-11-04 RX ADMIN — HYDROMORPHONE HYDROCHLORIDE 4 MG: 2 TABLET ORAL at 07:00

## 2023-11-04 RX ADMIN — HYDROMORPHONE HYDROCHLORIDE 4 MG: 2 TABLET ORAL at 14:49

## 2023-11-04 RX ADMIN — HYDROMORPHONE HYDROCHLORIDE 4 MG: 2 TABLET ORAL at 21:07

## 2023-11-04 RX ADMIN — INSULIN ASPART 2 UNITS: 100 INJECTION, SOLUTION INTRAVENOUS; SUBCUTANEOUS at 06:58

## 2023-11-04 ASSESSMENT — ACTIVITIES OF DAILY LIVING (ADL)
ADLS_ACUITY_SCORE: 36

## 2023-11-04 NOTE — PROGRESS NOTES
[facility] MEDICINE  PROGRESS NOTE     Code Status: Full Code  Procedure(s):  RIGHT TOTAL KNEE ARTHROPLASTY  2 Days Post-Op  Identification/Summary:   Savanah Chung is a 63 year old female with a history of essential hypertension, DM2, GERD, underwent right total knee arthroplasty.  EBL 50 mill.  Right knee pain is stable.  Hemodynamically stable postoperatively.  Blood sugars elevated 200+ during her stay.  Patient reports her Trulicity had been increased by her primary but had not yet started taking that dose.  Encourage patient to resume Trulicity at discharge and follow sugars closely.  If not showing improvement in the next 1 to 2 weeks should follow-up with primary care provider.     Assessment/plan:    Status post right total knee arthroplasty  Postoperative management per orthopedics.  Hemoglobin holding stable 12.9--> 12.8.  DM2  Hyperglycemia  Preoperative A1c 8.2.  Had just recently had her Trulicity increased.  Trulicity 1.5 mg once a week.  Reports had not yet increased to her recommended dose as an outpatient.  Patient reports significant intolerance to multiple oral diabetic medications in the past.  Diabetic diet and insulin sliding scale  Persistently elevated blood sugars over 200.   Follow closely at discharge.  As discussed above follow-up with primary care provider if readings not improving over the next 1 to 2 weeks.  Essential hypertension  Losartan 25 mg daily with hold parameters.  Hydrochlorothiazide 12.5 mg daily with hold parameters.  GERD  Resume PPI  Morbid obesity Body mass index is 42.27 kg/m .  Modification of lifestyle for weight loss  Outpatient sleep study to rule out obstructive sleep apnea  Hyponatremia  Mild sodium level dropped down to 134.  No further checks indicated.    Anticoagulation   Aspirin 81 mg twice daily per orthopedics.     COVID-19 PCR not tested     Fluids: Saline lock  Pain meds: Per surgery  Therapy: Per surgery  Bell:Not  "present  Lines: None       Current Diet  Orders Placed This Encounter      Advance Diet as Tolerated: Regular Diet Adult      Discharge Instruction - Regular Diet Adult    Supplements  None    Barriers to Discharge: Medically stable for discharge by orthopedics.    Disposition: Discharge med rec reviewed and our area of responsibility was addressed.    Clinically Significant Risk Factors Present on Admission                  # Hypertension: Home medication list includes antihypertensive(s)      # Severe Obesity: Estimated body mass index is 42.27 kg/m  as calculated from the following:    Height as of this encounter: 1.651 m (5' 5\").    Weight as of this encounter: 115.2 kg (254 lb).              Interval History/Subjective:  Patient's blood sugars have been in the 200s.  Notes that her Trulicity dose had been increased by her primary care provider but she has not started taking that higher dose yet.  Reports multiple intolerances to medications such as metformin and Jardiance glipizide or they were not effective.  Was unable to get insurance coverage for Ozempic so that is why she started Trulicity.  No chest pain.  No shortness of breath.  No nausea or vomiting.  Having some issues with pain control.  Hoping she can discharge home later today but uncertain if this will happen.  Questions answered to verbalized satisfaction.      Last 24H PRN:     HYDROmorphone (DILAUDID) injection 0.2 mg, 0.2 mg at 11/02/23 2252 **OR** HYDROmorphone (DILAUDID) injection 0.4 mg, 0.4 mg at 11/03/23 2240    HYDROmorphone (DILAUDID) tablet 2-4 mg, 4 mg at 11/04/23 0700    hydrOXYzine (ATARAX) tablet 25 mg, 25 mg at 11/04/23 0659    Physical Exam/Objective:  Temp:  [97.7  F (36.5  C)-98.9  F (37.2  C)] 98.3  F (36.8  C)  Pulse:  [] 82  Resp:  [16-18] 18  BP: (143-159)/(70-74) 146/74  SpO2:  [94 %-96 %] 94 %  Wt Readings from Last 4 Encounters:   11/02/23 115.2 kg (254 lb)     Body mass index is 42.27 kg/m .    Constitutional: " awake, alert, cooperative, no apparent distress, and appears stated age, fatigued, and morbidly obese  ENT: Normocephalic, without obvious abnormality, atraumatic, external ears without lesions, oral pharynx with moist mucous membranes, tonsils without erythema or exudates, gums normal and good dentition.  Respiratory: No increased work of breathing, good air exchange, clear to auscultation bilaterally, no crackles or wheezing  Cardiovascular: Normal apical impulse, regular rate and rhythm, normal S1 and S2, no S3 or S4, and no murmur noted  GI: No scars, normal bowel sounds, soft, non-distended, non-tender, no masses palpated, no hepatosplenomegally  Skin: normal skin color, texture, turgor, no redness, warmth, or swelling, and no rashes  Musculoskeletal: Limited lower extremity exam secondary to postoperative status.  Dressings in place.  Pulses strong and equal at the posterior tibialis and dorsalis pedis. no lower extremity pitting edema present  Neurologic: Cranial nerves II-XII are grossly intact. Sensory:  Sensory intact  Neuropsychiatric: General: normal, calm, and normal eye contact Level of consciousness: alert / normal Affect: normal Orientation: oriented to self, place, time and situation Memory and insight: normal, memory for past and recent events intact, and thought process normal      Medications:   Personally Reviewed.  Medications    lactated ringers 75 mL/hr at 11/03/23 0200      aspirin  81 mg Oral BID    insulin aspart  1-7 Units Subcutaneous TID AC    losartan  25 mg Oral Daily    pantoprazole  40 mg Oral QAM AC    polyethylene glycol  17 g Oral Daily    senna-docusate  1 tablet Oral BID    sodium chloride (PF)  3 mL Intracatheter Q8H       Data reviewed today: I personally reviewed all new medications, labs, imaging/diagnostics reports over the past 24 hours. Pertinent findings include:    Imaging:   No results found for this or any previous visit (from the past 24 hour(s)).    Labs:  XR Knee  Port Right 1/2 Views   Final Result   IMPRESSION:   1.  Right total knee arthroplasty with patellar resurfacing. The components are well seated.   2.  No fracture or joint malalignment.   3.  Postoperative intra-articular and soft tissue gas.      POC US Guidance Needle Placement   Final Result        Recent Results (from the past 24 hour(s))   CBC with platelets    Collection Time: 11/03/23  8:48 AM   Result Value Ref Range    WBC Count 10.0 4.0 - 11.0 10e3/uL    RBC Count 4.43 3.80 - 5.20 10e6/uL    Hemoglobin 12.9 11.7 - 15.7 g/dL    Hematocrit 39.0 35.0 - 47.0 %    MCV 88 78 - 100 fL    MCH 29.1 26.5 - 33.0 pg    MCHC 33.1 31.5 - 36.5 g/dL    RDW 12.4 10.0 - 15.0 %    Platelet Count 222 150 - 450 10e3/uL   Basic metabolic panel    Collection Time: 11/03/23  8:48 AM   Result Value Ref Range    Sodium 134 (L) 135 - 145 mmol/L    Potassium 3.9 3.4 - 5.3 mmol/L    Chloride 98 98 - 107 mmol/L    Carbon Dioxide (CO2) 28 22 - 29 mmol/L    Anion Gap 8 7 - 15 mmol/L    Urea Nitrogen 13.5 8.0 - 23.0 mg/dL    Creatinine 0.73 0.51 - 0.95 mg/dL    GFR Estimate >90 >60 mL/min/1.73m2    Calcium 8.4 (L) 8.8 - 10.2 mg/dL    Glucose 219 (H) 70 - 99 mg/dL   Glucose by meter    Collection Time: 11/03/23 12:10 PM   Result Value Ref Range    GLUCOSE BY METER POCT 215 (H) 70 - 99 mg/dL   Glucose by meter    Collection Time: 11/03/23  5:59 PM   Result Value Ref Range    GLUCOSE BY METER POCT 224 (H) 70 - 99 mg/dL   Glucose by meter    Collection Time: 11/03/23  9:59 PM   Result Value Ref Range    GLUCOSE BY METER POCT 232 (H) 70 - 99 mg/dL   Hemoglobin    Collection Time: 11/04/23  6:52 AM   Result Value Ref Range    Hemoglobin 12.8 11.7 - 15.7 g/dL   Glucose by meter    Collection Time: 11/04/23  6:57 AM   Result Value Ref Range    GLUCOSE BY METER POCT 218 (H) 70 - 99 mg/dL       Pending Labs:  Unresulted Labs Ordered in the Past 30 Days of this Admission       No orders found from 10/3/2023 to 11/3/2023.              Morris  MD Karol  [facility] Medicine  [facility]  Phone: #[phone]

## 2023-11-04 NOTE — UTILIZATION REVIEW
Concurrent stay review; Secondary Review Determination     Under the authority of the Utilization Management Committee, the utilization review process indicated a secondary review on Savanah Chung.  The review outcome is based on review of the medical records, discussions with staff, and applying clinical experience noted on the date of the review.        (x) Outpatient Status Appropriate - Concurrent stay review    RATIONALE FOR DETERMINATION   Savanah Chung is a 63 yr old female with DM2 on trulicity, obesity BMI 42, HTN on 2 agents who presented for right TKA.  Postop course complicated with pain management issues.  Adjusted to dilaudid PO on POD1 with much less IV pain med need and dose increased slightly on POD2 with currently no IV pain med need.  Anticipating ability to discharge soon.  Noted medically stable for discharge by [facility] team.    Patient is clinically improving and there is no clear indication to change patient's status to inpatient. The severity of illness, intensity of service provided, expected LOS and risk for adverse outcome make the care appropriate for observation.    The information on this document is developed by the utilization review team in order for the business office to ensure compliance.  This only denotes the appropriateness of proper admission status and does not reflect the quality of care rendered.         The definitions of Inpatient Status and Observation Status used in making the determination above are those provided in the CMS Coverage Manual, Chapter 1 and Chapter 6, section 70.4.      Sincerely,   Ludmila Denson MD  Utilization Review  Physician Advisor  [facility]

## 2023-11-04 NOTE — PROGRESS NOTES
Patient vital signs are at baseline: Yes  Patient able to ambulate as they were prior to admission or with assist devices provided by therapies during their stay:  Yes  Patient MUST void prior to discharge:  Yes  Patient able to tolerate oral intake:  Yes  Pain has adequate pain control using Oral analgesics:  No,  Reason:  required IV dilaudid  Does patient have an identified :  Yes  Has goal D/C date and time been discussed with patient:  Yes    Alert and oriented x 4, able to make needs known, CMS intact, dsg CDI, PIV SL, reported slept better this shift.

## 2023-11-04 NOTE — PLAN OF CARE
Patient vital signs are at baseline: Yes  Patient able to ambulate as they were prior to admission or with assist devices provided by therapies during their stay:  Yes  Patient MUST void prior to discharge:  Yes  Patient able to tolerate oral intake:  Yes  Pain has adequate pain control using Oral analgesics:  Yes  Does patient have an identified :  Yes  Has goal D/C date and time been discussed with patient:  Yes - Knee Dressing CDI. CMS WNL. Ice applied. Pt still endorcing high pain and [place] has increased the PO dilaudid to q3hrs PRN. Up to the bathroom frequently. Encouraging walks in the hallway, did get a walk in with OT this afternoon. Discharge pending pain control. Pt does not feel ready to discharge home yet, she told the PA that she doesn't have much support at home (her daughter works).

## 2023-11-04 NOTE — PROGRESS NOTES
Pt has been able to ambulate with walker and assist of one with leg to get off the bed.     Patient dresses and undresses independently.     Patient pain controlled by timely oral pain meds, ice, elevation.

## 2023-11-04 NOTE — PROGRESS NOTES
"Orthopedic Progress Note      Assessment: 2 Day Post-Op  S/P Procedure(s):  RIGHT TOTAL KNEE ARTHROPLASTY @    Plan:   - Continue PT/OT  - Weightbearing status: as tolerated  - Anticoagulation: ASA in addition to SCDs, rekha stockings and early ambulation.  - Pain control still difficult with current regimen and requiring IV medication. Will increase hydromorphone to q3 PRN.  - Discharge planning: Plan for patient to stay tonight for ongoing pain management and safety with mobility. Patient is stable for discharge if pain improves on new regimen. Would consider TCU placement if she continues to have difficulty with pain/independence tomorrow.      Subjective:  Pain: mild/moderate around surgical site  Chest pain, SOB: none  Nausea, Vomiting:  no  Lightheadedness, Dizziness:  no  Neuro:  Patient denies new onset numbness or paresthesias    Patient is still having difficulties with pain management. Does not feel confident in ability to care for herself at home with current pain. No new fevers/chills/SOB overnight.    Objective:  BP (!) 146/74 (BP Location: Left arm)   Pulse 82   Temp 98.3  F (36.8  C) (Oral)   Resp 18   Ht 1.651 m (5' 5\")   Wt 115.2 kg (254 lb)   SpO2 94%   BMI 42.27 kg/m    The patient is A&Ox3. Appears comfortable.   Sensation is intact.  Dorsiflexion and plantar flexion is intact.  Dorsalis pedis pulse intact.  Calves are soft and non-tender. Negative Valentin's.  The incision is covered. Dressing C/D/I.    No drain     Pertinent Labs   Lab Results: personally reviewed.   No results found for: \"INR\", \"PROTIME\"  Lab Results   Component Value Date    WBC 10.0 11/03/2023    HGB 12.8 11/04/2023    HCT 39.0 11/03/2023    MCV 88 11/03/2023     11/03/2023     Lab Results   Component Value Date     (L) 11/03/2023    CO2 28 11/03/2023         Report completed by:  Patrice Pacheco PA-C  [facility]    11/4/2023      "

## 2023-11-05 ENCOUNTER — APPOINTMENT (OUTPATIENT)
Dept: OCCUPATIONAL THERAPY | Facility: CLINIC | Age: 63
End: 2023-11-05
Attending: STUDENT IN AN ORGANIZED HEALTH CARE EDUCATION/TRAINING PROGRAM
Payer: COMMERCIAL

## 2023-11-05 VITALS
RESPIRATION RATE: 18 BRPM | DIASTOLIC BLOOD PRESSURE: 74 MMHG | SYSTOLIC BLOOD PRESSURE: 154 MMHG | HEART RATE: 100 BPM | WEIGHT: 254 LBS | HEIGHT: 65 IN | TEMPERATURE: 98.2 F | BODY MASS INDEX: 42.32 KG/M2 | OXYGEN SATURATION: 98 %

## 2023-11-05 LAB
GLUCOSE BLDC GLUCOMTR-MCNC: 182 MG/DL (ref 70–99)
GLUCOSE BLDC GLUCOMTR-MCNC: 183 MG/DL (ref 70–99)

## 2023-11-05 PROCEDURE — 250N000013 HC RX MED GY IP 250 OP 250 PS 637: Performed by: STUDENT IN AN ORGANIZED HEALTH CARE EDUCATION/TRAINING PROGRAM

## 2023-11-05 PROCEDURE — 250N000013 HC RX MED GY IP 250 OP 250 PS 637: Performed by: FAMILY MEDICINE

## 2023-11-05 PROCEDURE — 99214 OFFICE O/P EST MOD 30 MIN: CPT | Performed by: FAMILY MEDICINE

## 2023-11-05 PROCEDURE — 97535 SELF CARE MNGMENT TRAINING: CPT | Mod: GO

## 2023-11-05 PROCEDURE — 82962 GLUCOSE BLOOD TEST: CPT

## 2023-11-05 RX ORDER — ASPIRIN 81 MG/1
81 TABLET ORAL 2 TIMES DAILY
Qty: 60 TABLET | Refills: 0 | Status: SHIPPED | OUTPATIENT
Start: 2023-11-05 | End: 2023-11-05

## 2023-11-05 RX ORDER — ACETAMINOPHEN 325 MG/1
650 TABLET ORAL EVERY 4 HOURS PRN
Qty: 100 TABLET | Refills: 0 | Status: SHIPPED | OUTPATIENT
Start: 2023-11-05 | End: 2023-11-05

## 2023-11-05 RX ORDER — AMOXICILLIN 250 MG
1-2 CAPSULE ORAL 2 TIMES DAILY
Qty: 30 TABLET | Refills: 0 | Status: SHIPPED | OUTPATIENT
Start: 2023-11-05 | End: 2023-11-05

## 2023-11-05 RX ORDER — HYDROMORPHONE HYDROCHLORIDE 2 MG/1
4 TABLET ORAL
Qty: 32 TABLET | Refills: 0 | Status: SHIPPED | OUTPATIENT
Start: 2023-11-05

## 2023-11-05 RX ORDER — HYDROCHLOROTHIAZIDE 12.5 MG/1
12.5 CAPSULE ORAL DAILY
Status: DISCONTINUED | OUTPATIENT
Start: 2023-11-05 | End: 2023-11-05 | Stop reason: HOSPADM

## 2023-11-05 RX ADMIN — HYDROMORPHONE HYDROCHLORIDE 4 MG: 2 TABLET ORAL at 11:31

## 2023-11-05 RX ADMIN — INSULIN ASPART 1 UNITS: 100 INJECTION, SOLUTION INTRAVENOUS; SUBCUTANEOUS at 06:34

## 2023-11-05 RX ADMIN — ASPIRIN 81 MG: 81 TABLET, COATED ORAL at 07:57

## 2023-11-05 RX ADMIN — HYDROMORPHONE HYDROCHLORIDE 4 MG: 2 TABLET ORAL at 03:03

## 2023-11-05 RX ADMIN — LOSARTAN POTASSIUM 25 MG: 25 TABLET, FILM COATED ORAL at 07:57

## 2023-11-05 RX ADMIN — SENNOSIDES AND DOCUSATE SODIUM 1 TABLET: 8.6; 5 TABLET ORAL at 07:57

## 2023-11-05 RX ADMIN — ACETAMINOPHEN 650 MG: 325 TABLET ORAL at 03:03

## 2023-11-05 RX ADMIN — INSULIN ASPART 1 UNITS: 100 INJECTION, SOLUTION INTRAVENOUS; SUBCUTANEOUS at 11:28

## 2023-11-05 RX ADMIN — HYDROMORPHONE HYDROCHLORIDE 4 MG: 2 TABLET ORAL at 14:56

## 2023-11-05 ASSESSMENT — ACTIVITIES OF DAILY LIVING (ADL)
ADLS_ACUITY_SCORE: 36

## 2023-11-05 NOTE — PROGRESS NOTES
"Orthopedic Progress Note      Assessment: POD#3  S/P Procedure(s):  RIGHT TOTAL KNEE ARTHROPLASTY     Plan:   - Continue PT/OT  - Weightbearing status: as tolerated  - Anticoagulation: ASA in addition to SCDs, rekha stockings and early ambulation.  - Pain control: po dilaudid 2-4 mg 3 hours PRN in addition to scheduled tylenol and atarax  - Discharge planning: pt progressed through PT/OT, stable to discharge today but saying she does not have transportation via daughter until tomorrow.      Subjective:  Pain: mild/moderate around surgical site  Chest pain, SOB: none  Nausea, Vomiting:  no  Lightheadedness, Dizziness:  no  Neuro:  Patient denies new onset numbness or paresthesias    Pain better controlled today. Has not required IV pain med since 11/3. Difficulty raising leg in/out of bed.     Objective:  BP (!) 154/74 (BP Location: Left leg, Patient Position: Sitting, Cuff Size: Adult Large)   Pulse 100   Temp 98.2  F (36.8  C) (Oral)   Resp 18   Ht 1.651 m (5' 5\")   Wt 115.2 kg (254 lb)   SpO2 98%   BMI 42.27 kg/m    The patient is A&Ox3. Appears comfortable.   Sensation is intact.  Dorsiflexion and plantar flexion is intact.  Dorsalis pedis pulse intact.  Calves are soft and non-tender. Negative Valentin's.  The incision is covered. Dressing C/D/I.    No drain     Pertinent Labs   Lab Results: personally reviewed.   No results found for: \"INR\", \"PROTIME\"  Lab Results   Component Value Date    WBC 10.0 11/03/2023    HGB 12.8 11/04/2023    HCT 39.0 11/03/2023    MCV 88 11/03/2023     11/03/2023     Lab Results   Component Value Date     (L) 11/03/2023    CO2 28 11/03/2023       Fatmata Layne PA-C / Dr. Long  Date: 11/5/2023  Time: 9:30 AM  [facility]  [phone]        "

## 2023-11-05 NOTE — PROGRESS NOTES
[facility] MEDICINE  PROGRESS NOTE     Code Status: Full Code  Procedure(s):  RIGHT TOTAL KNEE ARTHROPLASTY  3 Days Post-Op  Identification/Summary:   Savanah Chung is a 63 year old female with a history of essential hypertension, DM2, GERD, underwent right total knee arthroplasty.  EBL 50 mill.  Right knee pain is stable.  Hemodynamically stable postoperatively.  Blood sugars elevated 200+ during her stay.  Patient reports her Trulicity had been increased by her primary but had not yet started taking that dose.  Encourage patient to resume increased dosage of Trulicity at discharge and follow sugars closely.  If not showing improvement in the next 1 to 2 weeks should follow-up with primary care provider.  Medically stable for discharge by orthopedics.     Assessment/plan:     Status post right total knee arthroplasty  Postoperative management per orthopedics.  Hemoglobin holding stable 12.9--> 12.8.  DM2  Hyperglycemia  Preoperative A1c 8.2.  Had just recently had her Trulicity increased.  Trulicity 1.5 mg once a week.  Reports had not yet increased to her recommended dose as an outpatient.  Patient reports significant intolerance to multiple oral diabetic medications in the past.  Diabetic diet and insulin sliding scale  Persistently elevated blood sugars over 200.   Patient does not wish to change her glucose management at this time.  Follow closely at discharge.  As discussed above follow-up with primary care provider if readings not improving over the next 1 to 2 weeks.  Essential hypertension  Losartan 25 mg daily with hold parameters.  Resume hydrochlorothiazide 12.5 mg daily with hold parameters.  GERD  Resume PPI  Morbid obesity Body mass index is 42.27 kg/m .  Modification of lifestyle for weight loss  Recommend outpatient sleep study to rule out obstructive sleep apnea  Hyponatremia  Mild sodium level dropped down to 134.  No further checks indicated.    "  Anticoagulation   Aspirin 81 mg twice daily per orthopedics.     COVID-19 PCR not tested     Fluids: Saline lock  Pain meds: Per surgery  Therapy: Per surgery   Bell:Not present  Lines: None       Current Diet  Orders Placed This Encounter      Advance Diet as Tolerated: Regular Diet Adult      Discharge Instruction - Regular Diet Adult    Supplements  None    Barriers to Discharge: Medically stable for discharge by orthopedics    Disposition: Discharge med rec reviewed and our area of responsibility was addressed.    Clinically Significant Risk Factors Present on Admission                  # Hypertension: Home medication list includes antihypertensive(s)      # Severe Obesity: Estimated body mass index is 42.27 kg/m  as calculated from the following:    Height as of this encounter: 1.651 m (5' 5\").    Weight as of this encounter: 115.2 kg (254 lb).              Interval History/Subjective:  Patient doing okay.  Pain under reasonable control.  No chest pain.  No shortness of breath.  No nausea or vomiting.  Glucose readings are around 200.  Does not wish to change her diabetic meds at this time.  Is hopeful that increasing her Trulicity will correct this.  Questions answered to verbalized satisfaction.      Last 24H PRN:     acetaminophen (TYLENOL) tablet 650 mg, 650 mg at 11/05/23 0303    HYDROmorphone (DILAUDID) tablet 2-4 mg, 4 mg at 11/05/23 0303    hydrOXYzine (ATARAX) tablet 25 mg, 25 mg at 11/04/23 2107    Physical Exam/Objective:  Temp:  [98  F (36.7  C)-98.4  F (36.9  C)] 98.2  F (36.8  C)  Pulse:  [100-108] 100  Resp:  [18-19] 18  BP: (125-172)/(60-74) 154/74  SpO2:  [92 %-98 %] 98 %  Wt Readings from Last 4 Encounters:   11/02/23 115.2 kg (254 lb)     Body mass index is 42.27 kg/m .    Constitutional: awake, alert, cooperative, no apparent distress, and appears stated age and moderately obese  ENT: Normocephalic, without obvious abnormality, atraumatic, external ears without lesions, oral pharynx with " moist mucous membranes, tonsils without erythema or exudates, gums normal and good dentition.  Respiratory: No increased work of breathing, good air exchange, clear to auscultation bilaterally, no crackles or wheezing  Cardiovascular: Normal apical impulse, regular rate and rhythm, normal S1 and S2, no S3 or S4, and no murmur noted  GI: No scars, normal bowel sounds, soft, non-distended, non-tender, no masses palpated, no hepatosplenomegally  Skin: normal skin color, texture, turgor, no redness, warmth, or swelling, and no rashes  Musculoskeletal: There is no redness, warmth, or swelling of the joints.  Motor strength is 5 out of 5 all extremities bilaterally.  Tone is normal. no lower extremity pitting edema present  Neurologic: Cranial nerves II-XII are grossly intact. Sensory:  Sensory intact  Neuropsychiatric: General: normal, calm, and normal eye contact Level of consciousness: alert / normal Affect: normal Orientation: oriented to self, place, time and situation Memory and insight: normal, memory for past and recent events intact, and thought process normal      Medications:   Personally Reviewed.  Medications    lactated ringers 75 mL/hr at 11/03/23 0200      aspirin  81 mg Oral BID    hydrochlorothiazide  12.5 mg Oral Daily    insulin aspart  1-7 Units Subcutaneous TID AC    losartan  25 mg Oral Daily    pantoprazole  40 mg Oral QAM AC    polyethylene glycol  17 g Oral Daily    senna-docusate  1 tablet Oral BID    sodium chloride (PF)  3 mL Intracatheter Q8H       Data reviewed today: I personally reviewed all new medications, labs, imaging/diagnostics reports over the past 24 hours. Pertinent findings include:    Imaging:   No results found for this or any previous visit (from the past 24 hour(s)).    Labs:  XR Knee Port Right 1/2 Views   Final Result   IMPRESSION:   1.  Right total knee arthroplasty with patellar resurfacing. The components are well seated.   2.  No fracture or joint malalignment.   3.   Postoperative intra-articular and soft tissue gas.      POC US Guidance Needle Placement   Final Result        Recent Results (from the past 24 hour(s))   Glucose by meter    Collection Time: 11/04/23 12:45 PM   Result Value Ref Range    GLUCOSE BY METER POCT 209 (H) 70 - 99 mg/dL   Glucose by meter    Collection Time: 11/04/23  5:17 PM   Result Value Ref Range    GLUCOSE BY METER POCT 195 (H) 70 - 99 mg/dL   Glucose by meter    Collection Time: 11/04/23  9:18 PM   Result Value Ref Range    GLUCOSE BY METER POCT 223 (H) 70 - 99 mg/dL   Glucose by meter    Collection Time: 11/05/23  6:32 AM   Result Value Ref Range    GLUCOSE BY METER POCT 182 (H) 70 - 99 mg/dL       Pending Labs:  Unresulted Labs Ordered in the Past 30 Days of this Admission       No orders found from 10/3/2023 to 11/3/2023.              Morris Roberson MD  [facility]  Phone: #[phone]

## 2023-11-05 NOTE — PLAN OF CARE
Problem: Plan of Care - These are the overarching goals to be used throughout the patient stay.    Goal: Absence of Hospital-Acquired Illness or Injury  Intervention: Identify and Manage Fall Risk  Recent Flowsheet Documentation  Taken 11/5/2023 0905 by Rivka Barnes RN  Safety Promotion/Fall Prevention: safety round/check completed  Taken 11/5/2023 0800 by Rivka Barnes RN  Safety Promotion/Fall Prevention:   activity supervised   assistive device/personal items within reach   clutter free environment maintained   lighting adjusted   nonskid shoes/slippers when out of bed  Taken 11/5/2023 0700 by Rivka Barnes RN  Safety Promotion/Fall Prevention: safety round/check completed  Intervention: Prevent Skin Injury  Recent Flowsheet Documentation  Taken 11/5/2023 0800 by Rivka Barnes RN  Body Position: position changed independently     Patient vital signs are at baseline: Yes  Patient able to ambulate as they were prior to admission or with assist devices provided by therapies during their stay:  Yes  Patient MUST void prior to discharge:  Yes  Patient able to tolerate oral intake:  Yes  Pain has adequate pain control using Oral analgesics:  Yes  Does patient have an identified :  Yes  Has goal D/C date and time been discussed with patient:  Yes    Patient A&O, VSS, and CMS intact. Painful, but managing. No drainage on dressing. Tolerating oral intake, ambulating, and voiding. Up with a of 1. All alarms in place and call light appropriate.  Rivka Barnes RN

## 2023-11-05 NOTE — PROGRESS NOTES
Occupational Therapy Discharge Summary    Reason for therapy discharge:    All goals and outcomes met, no further needs identified.    Progress towards therapy goal(s). See goals on Care Plan in [facility] electronic health record for goal details.  Goals met    Therapy recommendation(s):    No further therapy is recommended.

## 2023-11-05 NOTE — DISCHARGE SUMMARY
"ORTHOPEDIC DISCHARGE SUMMARY       Savanah Chung,  1960, MRN [id number]    Admission Date: 2023      Admission Diagnoses: Osteoarthritis of right knee [M17.11]  S/P total knee arthroplasty, right [Z96.651]     Discharge Date:      Post-operative Day:  3 Days Post-Op    Reason for Admission: The patient was admitted for the following: Procedure(s):  RIGHT TOTAL KNEE ARTHROPLASTY    BRIEF HOSPITAL COURSE   Savanah Chung is a pleasant 63 year old female who underwent the aforementioned procedure with Dr. Long on 23. There were no intraoperative complications and the patient was transferred to the recovery room and later the orthopedic unit in stable condition. Once the patient reached the orthopedic floor our orthopedic pain protocol was implemented along with the following:    Anticoagulation Medications: ASA  Therapy: PT, OT, and None  Activity: WBAT  Bracing: none    Consultations during Admission: Hospitalist service for medical management     COMPLICATIONS/SIGNIFICANT FINDINGS    none    DISCHARGE INFORMATION   Condition at discharge: Good  Discharge destination: Home  Patient was seen by myself on the date of discharge.    FOLLOW UP CARE   Follow up with orthopedics in 2 weeks or sooner should the need arise. Ortho will continue to manage pain control, post op anticoagulation and incision care.     Follow up with your PCP for management of chronic medical problems and to evaluate for post op medical complications including constipation, nausea/vomiting, DVT/PE, anemia, changes in blood pressure, fevers/chills, urinary retention and atelectasis/pneumonia.     Subjective   Patient is doing well on POD #1. Pain is well controlled with oral medications. Ambulating. Tolerating oral intake.     Physical Exam   BP (!) 154/74 (BP Location: Left leg, Patient Position: Sitting, Cuff Size: Adult Large)   Pulse 100   Temp 98.2  F (36.8  C) (Oral)   Resp 18   Ht 1.651 m (5' 5\")   Wt 115.2 kg (254 lb)  "  SpO2 98%   BMI 42.27 kg/m    The patient is A&Ox3. Appears comfortable.   Sensation is intact.  Dorsiflexion and plantar flexion is intact.  Dorsalis pedis pulse intact.  Calves are soft and non-tender. Negative Valentin's.  The incision is covered. Dressing C/D/I.     No drain     Pertinent Results at Discharge     Hemoglobin   Date/Time Value Ref Range Status   11/04/2023 06:52 AM 12.8 11.7 - 15.7 g/dL Final   11/03/2023 08:48 AM 12.9 11.7 - 15.7 g/dL Final     Platelet Count   Date/Time Value Ref Range Status   11/03/2023 08:48  150 - 450 10e3/uL Final       Problem List   Principal Problem:    S/P total knee arthroplasty, right      Rosa Maria Layne PA-C/Dr. Long  [facility]  [phone]  Date: 11/5/2023  Time: 1:40 PM

## 2023-11-05 NOTE — PLAN OF CARE
Patient vital signs are at baseline: No,  Reason:  tachycardic, denied symptoms  Patient able to ambulate as they were prior to admission or with assist devices provided by therapies during their stay:  Yes  Patient MUST void prior to discharge:  Yes,   Patient able to tolerate oral intake:  Yes  Pain has adequate pain control using Oral analgesics:  Yes  Does patient have an identified :  Yes  Has goal D/C date and time been discussed with patient:  Yes     Alert and oriented x 4, able to make needs known, CMS intact, dsg CDI, PIV SL, IS encouraged, slept comfortably this shift.

## (undated) DEVICE — HOLDER LIMB VELCRO OR 0814-1533

## (undated) DEVICE — GLOVE BIOGEL PI SZ 8.0 40880

## (undated) DEVICE — CAST PADDING 4" STERILE 9044S

## (undated) DEVICE — SOL NACL 0.9% IRRIG 1000ML BOTTLE 2F7124

## (undated) DEVICE — CUSTOM PACK TOTAL KNEE ACCESSORY SOP5BTAHEA

## (undated) DEVICE — PLATE GROUNDING ADULT W/CORD 9165L

## (undated) DEVICE — DRAPE CONVERTORS U-DRAPE 60X72" 8476

## (undated) DEVICE — PREP CHLORAPREP W/ORANGE TINT 10.5ML 930715

## (undated) DEVICE — BONE CLEANING TIP INTERPULSE  0210-010-000

## (undated) DEVICE — SU ETHIBOND 2 V-37 4X30" MX69G

## (undated) DEVICE — A3 SUPPLIES- SEE NURSING INFO PAGE

## (undated) DEVICE — CUSTOM PACK TOTAL KNEE SOP5BTKHEC

## (undated) DEVICE — SU DERMABOND ADVANCED .7ML DNX12

## (undated) DEVICE — SOL WATER IRRIG 1000ML BOTTLE 2F7114

## (undated) DEVICE — DRESSING MEPILEX AG SILVER 4X12 395990

## (undated) DEVICE — DECANTER VIAL 2006S

## (undated) DEVICE — BLADE SAW SAGITTAL STRK DUAL CUT 4118-135-090

## (undated) DEVICE — SUTURE VICRYL+ 2-0 27IN CT-1 UND VCP259H

## (undated) DEVICE — SUTURE MONOCRYL 3-0 18 PS2 UND MCP497G

## (undated) DEVICE — SOLUTION IRRIG 2B7127 .9NS 3000ML BAG

## (undated) DEVICE — SUTURE VICRYL+ 0 36IN CT-1 UND VCP946H

## (undated) DEVICE — SU STRATAFIX PDS PLUS 1 CT-1 18" SXPP1A404

## (undated) DEVICE — GLOVE BIOGEL PI INDICATOR 8.0 LF 41680

## (undated) RX ORDER — PROPOFOL 10 MG/ML
INJECTION, EMULSION INTRAVENOUS
Status: DISPENSED
Start: 2023-11-02

## (undated) RX ORDER — BUPIVACAINE HYDROCHLORIDE 5 MG/ML
INJECTION, SOLUTION EPIDURAL; INTRACAUDAL
Status: DISPENSED
Start: 2023-11-02

## (undated) RX ORDER — DEXAMETHASONE SODIUM PHOSPHATE 4 MG/ML
INJECTION, SOLUTION INTRA-ARTICULAR; INTRALESIONAL; INTRAMUSCULAR; INTRAVENOUS; SOFT TISSUE
Status: DISPENSED
Start: 2023-11-02

## (undated) RX ORDER — ONDANSETRON 2 MG/ML
INJECTION INTRAMUSCULAR; INTRAVENOUS
Status: DISPENSED
Start: 2023-11-02

## (undated) RX ORDER — FENTANYL CITRATE 50 UG/ML
INJECTION, SOLUTION INTRAMUSCULAR; INTRAVENOUS
Status: DISPENSED
Start: 2023-11-02

## (undated) RX ORDER — LIDOCAINE HYDROCHLORIDE 10 MG/ML
INJECTION, SOLUTION EPIDURAL; INFILTRATION; INTRACAUDAL; PERINEURAL
Status: DISPENSED
Start: 2023-11-02

## (undated) RX ORDER — FENTANYL CITRATE-0.9 % NACL/PF 10 MCG/ML
PLASTIC BAG, INJECTION (ML) INTRAVENOUS
Status: DISPENSED
Start: 2023-11-02